# Patient Record
Sex: MALE | Race: WHITE | HISPANIC OR LATINO | Employment: FULL TIME | URBAN - METROPOLITAN AREA
[De-identification: names, ages, dates, MRNs, and addresses within clinical notes are randomized per-mention and may not be internally consistent; named-entity substitution may affect disease eponyms.]

---

## 2017-04-27 ENCOUNTER — HOSPITAL ENCOUNTER (OUTPATIENT)
Dept: RADIOLOGY | Facility: MEDICAL CENTER | Age: 52
End: 2017-04-27

## 2017-04-27 ENCOUNTER — HOSPITAL ENCOUNTER (INPATIENT)
Facility: MEDICAL CENTER | Age: 52
LOS: 1 days | DRG: 638 | End: 2017-04-28
Attending: HOSPITALIST | Admitting: HOSPITALIST
Payer: COMMERCIAL

## 2017-04-27 ENCOUNTER — RESOLUTE PROFESSIONAL BILLING HOSPITAL PROF FEE (OUTPATIENT)
Dept: HOSPITALIST | Facility: MEDICAL CENTER | Age: 52
End: 2017-04-27
Payer: COMMERCIAL

## 2017-04-27 DIAGNOSIS — L03.116 CELLULITIS OF LEFT LOWER EXTREMITY: ICD-10-CM

## 2017-04-27 PROBLEM — L03.90 CELLULITIS: Status: ACTIVE | Noted: 2017-04-27

## 2017-04-27 PROBLEM — E78.5 HYPERLIPIDEMIA: Status: ACTIVE | Noted: 2017-04-27

## 2017-04-27 PROBLEM — I10 HYPERTENSION: Status: ACTIVE | Noted: 2017-04-27

## 2017-04-27 LAB
ANION GAP SERPL CALC-SCNC: 5 MMOL/L (ref 0–11.9)
BASOPHILS # BLD AUTO: 0.7 % (ref 0–1.8)
BASOPHILS # BLD: 0.06 K/UL (ref 0–0.12)
BUN SERPL-MCNC: 16 MG/DL (ref 8–22)
CALCIUM SERPL-MCNC: 8.7 MG/DL (ref 8.5–10.5)
CHLORIDE SERPL-SCNC: 104 MMOL/L (ref 96–112)
CO2 SERPL-SCNC: 26 MMOL/L (ref 20–33)
CREAT SERPL-MCNC: 0.75 MG/DL (ref 0.5–1.4)
EOSINOPHIL # BLD AUTO: 0.07 K/UL (ref 0–0.51)
EOSINOPHIL NFR BLD: 0.9 % (ref 0–6.9)
ERYTHROCYTE [DISTWIDTH] IN BLOOD BY AUTOMATED COUNT: 38.3 FL (ref 35.9–50)
EST. AVERAGE GLUCOSE BLD GHB EST-MCNC: 263 MG/DL
GFR SERPL CREATININE-BSD FRML MDRD: >60 ML/MIN/1.73 M 2
GLUCOSE BLD-MCNC: 259 MG/DL (ref 65–99)
GLUCOSE BLD-MCNC: 271 MG/DL (ref 65–99)
GLUCOSE BLD-MCNC: 296 MG/DL (ref 65–99)
GLUCOSE BLD-MCNC: 309 MG/DL (ref 65–99)
GLUCOSE SERPL-MCNC: 295 MG/DL (ref 65–99)
GRAM STN SPEC: NORMAL
HBA1C MFR BLD: 10.8 % (ref 0–5.6)
HCT VFR BLD AUTO: 37.5 % (ref 42–52)
HGB BLD-MCNC: 13 G/DL (ref 14–18)
IMM GRANULOCYTES # BLD AUTO: 0.03 K/UL (ref 0–0.11)
IMM GRANULOCYTES NFR BLD AUTO: 0.4 % (ref 0–0.9)
LYMPHOCYTES # BLD AUTO: 1.2 K/UL (ref 1–4.8)
LYMPHOCYTES NFR BLD: 14.7 % (ref 22–41)
MCH RBC QN AUTO: 30.4 PG (ref 27–33)
MCHC RBC AUTO-ENTMCNC: 34.7 G/DL (ref 33.7–35.3)
MCV RBC AUTO: 87.8 FL (ref 81.4–97.8)
MONOCYTES # BLD AUTO: 0.91 K/UL (ref 0–0.85)
MONOCYTES NFR BLD AUTO: 11.2 % (ref 0–13.4)
NEUTROPHILS # BLD AUTO: 5.88 K/UL (ref 1.82–7.42)
NEUTROPHILS NFR BLD: 72.1 % (ref 44–72)
NRBC # BLD AUTO: 0 K/UL
NRBC BLD AUTO-RTO: 0 /100 WBC
PLATELET # BLD AUTO: 163 K/UL (ref 164–446)
PMV BLD AUTO: 11.9 FL (ref 9–12.9)
POTASSIUM SERPL-SCNC: 4.6 MMOL/L (ref 3.6–5.5)
RBC # BLD AUTO: 4.27 M/UL (ref 4.7–6.1)
SIGNIFICANT IND 70042: NORMAL
SITE SITE: NORMAL
SODIUM SERPL-SCNC: 135 MMOL/L (ref 135–145)
SOURCE SOURCE: NORMAL
WBC # BLD AUTO: 8.2 K/UL (ref 4.8–10.8)

## 2017-04-27 PROCEDURE — 87070 CULTURE OTHR SPECIMN AEROBIC: CPT

## 2017-04-27 PROCEDURE — A9270 NON-COVERED ITEM OR SERVICE: HCPCS | Performed by: HOSPITALIST

## 2017-04-27 PROCEDURE — 700102 HCHG RX REV CODE 250 W/ 637 OVERRIDE(OP): Performed by: HOSPITALIST

## 2017-04-27 PROCEDURE — 700101 HCHG RX REV CODE 250: Performed by: HOSPITALIST

## 2017-04-27 PROCEDURE — 83036 HEMOGLOBIN GLYCOSYLATED A1C: CPT

## 2017-04-27 PROCEDURE — 97161 PT EVAL LOW COMPLEX 20 MIN: CPT

## 2017-04-27 PROCEDURE — 770006 HCHG ROOM/CARE - MED/SURG/GYN SEMI*

## 2017-04-27 PROCEDURE — 80048 BASIC METABOLIC PNL TOTAL CA: CPT

## 2017-04-27 PROCEDURE — 85025 COMPLETE CBC W/AUTO DIFF WBC: CPT

## 2017-04-27 PROCEDURE — 87205 SMEAR GRAM STAIN: CPT

## 2017-04-27 PROCEDURE — 36415 COLL VENOUS BLD VENIPUNCTURE: CPT

## 2017-04-27 PROCEDURE — 700105 HCHG RX REV CODE 258: Performed by: HOSPITALIST

## 2017-04-27 PROCEDURE — 82962 GLUCOSE BLOOD TEST: CPT

## 2017-04-27 PROCEDURE — 700111 HCHG RX REV CODE 636 W/ 250 OVERRIDE (IP): Performed by: HOSPITALIST

## 2017-04-27 PROCEDURE — 99223 1ST HOSP IP/OBS HIGH 75: CPT | Performed by: HOSPITALIST

## 2017-04-27 RX ORDER — AMOXICILLIN 250 MG
2 CAPSULE ORAL 2 TIMES DAILY
Status: DISCONTINUED | OUTPATIENT
Start: 2017-04-27 | End: 2017-04-28 | Stop reason: HOSPADM

## 2017-04-27 RX ORDER — ATORVASTATIN CALCIUM 10 MG/1
10 TABLET, FILM COATED ORAL NIGHTLY
Status: DISCONTINUED | OUTPATIENT
Start: 2017-04-27 | End: 2017-04-28 | Stop reason: HOSPADM

## 2017-04-27 RX ORDER — MORPHINE SULFATE 4 MG/ML
2 INJECTION, SOLUTION INTRAMUSCULAR; INTRAVENOUS
Status: DISCONTINUED | OUTPATIENT
Start: 2017-04-27 | End: 2017-04-28 | Stop reason: HOSPADM

## 2017-04-27 RX ORDER — INSULIN GLARGINE 100 [IU]/ML
55 INJECTION, SOLUTION SUBCUTANEOUS NIGHTLY
COMMUNITY

## 2017-04-27 RX ORDER — LISINOPRIL 10 MG/1
10 TABLET ORAL DAILY
Status: DISCONTINUED | OUTPATIENT
Start: 2017-04-27 | End: 2017-04-28 | Stop reason: HOSPADM

## 2017-04-27 RX ORDER — SODIUM CHLORIDE 9 MG/ML
2000 INJECTION, SOLUTION INTRAVENOUS ONCE
Status: COMPLETED | OUTPATIENT
Start: 2017-04-27 | End: 2017-04-27

## 2017-04-27 RX ORDER — LISINOPRIL 10 MG/1
10 TABLET ORAL DAILY
COMMUNITY

## 2017-04-27 RX ORDER — OXYCODONE HYDROCHLORIDE 5 MG/1
2.5 TABLET ORAL
Status: DISCONTINUED | OUTPATIENT
Start: 2017-04-27 | End: 2017-04-28 | Stop reason: HOSPADM

## 2017-04-27 RX ORDER — OXYCODONE HYDROCHLORIDE 5 MG/1
5 TABLET ORAL
Status: DISCONTINUED | OUTPATIENT
Start: 2017-04-27 | End: 2017-04-28 | Stop reason: HOSPADM

## 2017-04-27 RX ORDER — ATORVASTATIN CALCIUM 10 MG/1
10 TABLET, FILM COATED ORAL NIGHTLY
COMMUNITY

## 2017-04-27 RX ORDER — BISACODYL 10 MG
10 SUPPOSITORY, RECTAL RECTAL
Status: DISCONTINUED | OUTPATIENT
Start: 2017-04-27 | End: 2017-04-28 | Stop reason: HOSPADM

## 2017-04-27 RX ORDER — ACETAMINOPHEN 325 MG/1
650 TABLET ORAL EVERY 6 HOURS PRN
Status: DISCONTINUED | OUTPATIENT
Start: 2017-04-27 | End: 2017-04-28 | Stop reason: HOSPADM

## 2017-04-27 RX ORDER — POLYETHYLENE GLYCOL 3350 17 G/17G
1 POWDER, FOR SOLUTION ORAL
Status: DISCONTINUED | OUTPATIENT
Start: 2017-04-27 | End: 2017-04-28 | Stop reason: HOSPADM

## 2017-04-27 RX ORDER — DEXTROSE MONOHYDRATE 25 G/50ML
25 INJECTION, SOLUTION INTRAVENOUS
Status: DISCONTINUED | OUTPATIENT
Start: 2017-04-27 | End: 2017-04-28 | Stop reason: HOSPADM

## 2017-04-27 RX ORDER — INSULIN GLARGINE 100 [IU]/ML
32 INJECTION, SOLUTION SUBCUTANEOUS NIGHTLY
Status: DISCONTINUED | OUTPATIENT
Start: 2017-04-27 | End: 2017-04-28 | Stop reason: HOSPADM

## 2017-04-27 RX ADMIN — INSULIN LISPRO 5 UNITS: 100 INJECTION, SOLUTION INTRAVENOUS; SUBCUTANEOUS at 17:49

## 2017-04-27 RX ADMIN — ACETAMINOPHEN 650 MG: 325 TABLET, FILM COATED ORAL at 11:58

## 2017-04-27 RX ADMIN — DOXYCYCLINE 100 MG: 100 INJECTION, POWDER, LYOPHILIZED, FOR SOLUTION INTRAVENOUS at 21:05

## 2017-04-27 RX ADMIN — ATORVASTATIN CALCIUM 10 MG: 10 TABLET, FILM COATED ORAL at 20:56

## 2017-04-27 RX ADMIN — INSULIN LISPRO 6 UNITS: 100 INJECTION, SOLUTION INTRAVENOUS; SUBCUTANEOUS at 06:33

## 2017-04-27 RX ADMIN — INSULIN LISPRO 5 UNITS: 100 INJECTION, SOLUTION INTRAVENOUS; SUBCUTANEOUS at 21:00

## 2017-04-27 RX ADMIN — SODIUM CHLORIDE 2000 ML: 9 INJECTION, SOLUTION INTRAVENOUS at 03:18

## 2017-04-27 RX ADMIN — LISINOPRIL 10 MG: 10 TABLET ORAL at 08:05

## 2017-04-27 RX ADMIN — INSULIN LISPRO 5 UNITS: 100 INJECTION, SOLUTION INTRAVENOUS; SUBCUTANEOUS at 12:11

## 2017-04-27 RX ADMIN — INSULIN GLARGINE 32 UNITS: 100 INJECTION, SOLUTION SUBCUTANEOUS at 21:02

## 2017-04-27 RX ADMIN — MORPHINE SULFATE 2 MG: 4 INJECTION INTRAVENOUS at 22:46

## 2017-04-27 RX ADMIN — ACETAMINOPHEN 650 MG: 325 TABLET, FILM COATED ORAL at 21:22

## 2017-04-27 RX ADMIN — STANDARDIZED SENNA CONCENTRATE AND DOCUSATE SODIUM 2 TABLET: 8.6; 5 TABLET, FILM COATED ORAL at 20:57

## 2017-04-27 RX ADMIN — AMPICILLIN SODIUM AND SULBACTAM SODIUM 3 G: 2; 1 INJECTION, POWDER, FOR SOLUTION INTRAMUSCULAR; INTRAVENOUS at 17:11

## 2017-04-27 RX ADMIN — MORPHINE SULFATE 2 MG: 4 INJECTION INTRAVENOUS at 15:10

## 2017-04-27 RX ADMIN — AMPICILLIN SODIUM AND SULBACTAM SODIUM 3 G: 2; 1 INJECTION, POWDER, FOR SOLUTION INTRAMUSCULAR; INTRAVENOUS at 06:09

## 2017-04-27 RX ADMIN — DOXYCYCLINE 100 MG: 100 INJECTION, POWDER, LYOPHILIZED, FOR SOLUTION INTRAVENOUS at 08:06

## 2017-04-27 RX ADMIN — AMPICILLIN SODIUM AND SULBACTAM SODIUM 3 G: 2; 1 INJECTION, POWDER, FOR SOLUTION INTRAMUSCULAR; INTRAVENOUS at 11:13

## 2017-04-27 ASSESSMENT — PAIN SCALES - GENERAL
PAINLEVEL_OUTOF10: 0
PAINLEVEL_OUTOF10: 10
PAINLEVEL_OUTOF10: 8
PAINLEVEL_OUTOF10: 5
PAINLEVEL_OUTOF10: 3

## 2017-04-27 ASSESSMENT — LIFESTYLE VARIABLES
ALCOHOL_USE: NO
EVER_SMOKED: NEVER

## 2017-04-27 NOTE — IP AVS SNAPSHOT
" Home Care Instructions                                                                                                                  Name:Epi Cortés  Medical Record Number:[id number]  CSN: [id number]    YOB: 1965   Age: 51 y.o.  Sex: male  HT:1.651 m (5' 5\") WT: 89.7 kg (197 lb 12 oz)          Admit Date: 4/27/2017     Discharge Date:   Today's Date: 4/28/2017  Attending Doctor:  Rojelio Zhou M.D.                  Allergies:  Review of patient's allergies indicates no known allergies.            Discharge Instructions       Discharge Instructions    Discharged to home by car with relative. Discharged via walking, hospital escort: Refused.  Special equipment needed: Crutches    Be sure to schedule a follow-up appointment with your primary care doctor or any specialists as instructed.     Discharge Plan:   Diet Plan: Discussed  Activity Level: Discussed  Confirmed Follow up Appointment: Appointment Scheduled  Confirmed Symptoms Management: Discussed  Medication Reconciliation Updated: Yes  Influenza Vaccine Indication: Not indicated: Previously immunized this influenza season and > 8 years of age    I understand that a diet low in cholesterol, fat, and sodium is recommended for good health. Unless I have been given specific instructions below for another diet, I accept this instruction as my diet prescription.   Other diet: Diabetic    Special Instructions: None    · Is patient discharged on Warfarin / Coumadin?   No     · Is patient Post Blood Transfusion?  No    Depression / Suicide Risk    As you are discharged from this [facility] facility, it is important to learn how to keep safe from harming yourself.    Recognize the warning signs:  · Abrupt changes in personality, positive or negative- including increase in energy   · Giving away possessions  · Change in eating patterns- significant weight changes-  positive or negative  · Change in sleeping patterns- unable to sleep or sleeping all " the time   · Unwillingness or inability to communicate  · Depression  · Unusual sadness, discouragement and loneliness  · Talk of wanting to die  · Neglect of personal appearance   · Rebelliousness- reckless behavior  · Withdrawal from people/activities they love  · Confusion- inability to concentrate     If you or a loved one observes any of these behaviors or has concerns about self-harm, here's what you can do:  · Talk about it- your feelings and reasons for harming yourself  · Remove any means that you might use to hurt yourself (examples: pills, rope, extension cords, firearm)  · Get professional help from the community (Mental Health, Substance Abuse, psychological counseling)  · Do not be alone:Call your Safe Contact- someone whom you trust who will be there for you.  · Call your local CRISIS HOTLINE 718-9834 or 920-788-5742  · Call your local Children's Mobile Crisis Response Team [place] (625) 531-6752 or www.[employer]  · Call the toll free National Suicide Prevention Hotlines   · National Suicide Prevention Lifeline 143-991-FWLM (3519)  · [employer] Hope Line Network 800-SUICIDE (861-8506)  Type 2 Diabetes Mellitus, Adult  Type 2 diabetes mellitus is a long-term (chronic) disease. In type 2 diabetes:  The pancreas does not make enough of a hormone called insulin.  The cells in the body do not respond as well to the insulin that is made.  Both of the above can happen.  Normally, insulin moves sugars from food into tissue cells. This gives you energy. If you have type 2 diabetes, sugars cannot be moved into tissue cells. This causes high blood sugar (hyperglycemia).   Your doctors will set personal treatment goals for you based on your age, your medicines, how long you have had diabetes, and any other medical conditions you have. Generally, the goal of treatment is to maintain the following blood glucose levels:  Before meals (preprandial):  mg/dL.  After meals (postprandial): below 180  mg/dL.  A1c: less than 6.5-7%.  HOME CARE  Have your hemoglobin A1c level checked twice a year. The level shows if your diabetes is under control or out of control.  Test your blood sugar level every day as told by your doctor.  Check your ketone levels by testing your pee (urine) when you are sick and as told.  Take your diabetes or insulin medicine as told by your doctor.  Never run out of insulin.  Adjust how much insulin you give yourself based on how many carbs (carbohydrates) you eat. Carbs are in many foods, such as fruits, vegetables, whole grains, and dairy products.  Have a healthy snack between every healthy meal. Have 3 meals and 3 snacks a day.  Lose weight if you are overweight.  Carry a medical alert card or wear your medical alert jewelry.  Carry a 15-gram carb snack with you at all times. Examples include:  Glucose pills, 3 or 4.  Glucose gel, 15-gram tube.  Raisins, 2 tablespoons (24 grams).  Jelly beans, 6.  Animal crackers, 8.  Regular (not diet) pop, 4 ounces (120 milliliters).  Gummy treats, 9.  Notice low blood sugar (hypoglycemia) symptoms, such as:  Shaking (tremors).  Trouble thinking clearly.  Sweating.  Faster heart rate.  Headache.  Dry mouth.  Hunger.  Crabbiness (irritability).  Being worried or tense (anxious).  Restless sleep.  A change in speech or coordination.  Confusion.  Treat low blood sugar right away. If you are alert and can swallow, follow the 15:15 rule:  Take 15-20 grams of a rapid-acting glucose or carb. This includes glucose gel, glucose pills, or 4 ounces (120 milliliters) of fruit juice, regular pop, or low-fat milk.  Check your blood sugar level 15 minutes after taking the glucose.  Take 15-20 grams more of glucose if the repeat blood sugar level is still 70 mg/dL (milligrams/deciliter) or below.  Eat a meal or snack within 1 hour of the blood sugar levels going back to normal.  Notice early symptoms of high blood sugar, such as:  Being really thirsty or drinking a  lot (polydipsia).  Peeing a lot (polyuria).  Do at least 150 minutes of physical activity a week or as told.  Split the 150 minutes of activity up during the week. Do not do 150 minutes of activity in one day.  Perform exercises, such as weight lifting, at least 2 times a week or as told.  Spend no more than 90 minutes at one time inactive.  Adjust your insulin or food intake as needed if you start a new exercise or sport.  Follow your sick-day plan when you are not able to eat or drink as usual.  Do not smoke, chew tobacco, or use electronic cigarettes.  Women who are not pregnant should drink no more than 1 drink a day. Men should drink no more than 2 drinks a day.  Only drink alcohol with food.  Ask your doctor if alcohol is safe for you.  Tell your doctor if you drink alcohol several times during the week.  See your doctor regularly.  Schedule an eye exam soon after you are told you have diabetes. Schedule exams once every year.  Check your skin and feet every day. Check for cuts, bruises, redness, nail problems, bleeding, blisters, or sores. A doctor should do a foot exam once a year.  Brush your teeth and gums twice a day. Floss once a day. Visit your dentist regularly.  Share your diabetes plan with your workplace or school.  Keep your shots that fight diseases (vaccines) up to date.  Get a flu (influenza) shot every year.  Get a pneumonia shot. If you are 65 years of age or older and you have never gotten a pneumonia shot, you might need to get two shots.  Ask your doctor which other shots you should get.  Learn how to deal with stress.  Get diabetes education and support as needed.  Ask your doctor for special help if:  You need help to maintain or improve how you do things on your own.  You need help to maintain or improve the quality of your life.  You have foot or hand problems.  You have trouble cleaning yourself, dressing, eating, or doing physical activity.  GET HELP IF:  You are unable to eat or  drink for more than 6 hours.  You feel sick to your stomach (nauseous) or throw up (vomit) for more than 6 hours.  Your blood sugar level is over 240 mg/dL.  There is a change in mental status.  You get another serious illness.  You have watery poop (diarrhea) for more than 6 hours.  You have been sick or have had a fever for 2 or more days and are not getting better.  You have pain when you are active.  GET HELP RIGHT AWAY IF:  You have trouble breathing.  Your ketone levels are higher than your doctor says they should be.  MAKE SURE YOU:  Understand these instructions.  Will watch your condition.  Will get help right away if you are not doing well or get worse.     This information is not intended to replace advice given to you by your health care provider. Make sure you discuss any questions you have with your health care provider.     Document Released: 09/26/2009 Document Revised: 05/03/2016 Document Reviewed: 07/19/2013  [employer] Interactive Patient Education ©2016 [employer]  Diabetes and Foot Care  Diabetes may cause you to have problems because of poor blood supply (circulation) to your feet and legs. This may cause the skin on your feet to become thinner, break easier, and heal more slowly. Your skin may become dry, and the skin may peel and crack. You may also have nerve damage in your legs and feet causing decreased feeling in them. You may not notice minor injuries to your feet that could lead to infections or more serious problems. Taking care of your feet is one of the most important things you can do for yourself.   HOME CARE INSTRUCTIONS  Wear shoes at all times, even in the house. Do not go barefoot. Bare feet are easily injured.  Check your feet daily for blisters, cuts, and redness. If you cannot see the bottom of your feet, use a mirror or ask someone for help.  Wash your feet with warm water (do not use hot water) and mild soap. Then pat your feet and the areas between your toes until they  are completely dry. Do not soak your feet as this can dry your skin.  Apply a moisturizing lotion or petroleum jelly (that does not contain alcohol and is unscented) to the skin on your feet and to dry, brittle toenails. Do not apply lotion between your toes.  Trim your toenails straight across. Do not dig under them or around the cuticle. File the edges of your nails with an emery board or nail file.  Do not cut corns or calluses or try to remove them with medicine.  Wear clean socks or stockings every day. Make sure they are not too tight. Do not wear knee-high stockings since they may decrease blood flow to your legs.  Wear shoes that fit properly and have enough cushioning. To break in new shoes, wear them for just a few hours a day. This prevents you from injuring your feet. Always look in your shoes before you put them on to be sure there are no objects inside.  Do not cross your legs. This may decrease the blood flow to your feet.  If you find a minor scrape, cut, or break in the skin on your feet, keep it and the skin around it clean and dry. These areas may be cleansed with mild soap and water. Do not cleanse the area with peroxide, alcohol, or iodine.  When you remove an adhesive bandage, be sure not to damage the skin around it.  If you have a wound, look at it several times a day to make sure it is healing.  Do not use heating pads or hot water bottles. They may burn your skin. If you have lost feeling in your feet or legs, you may not know it is happening until it is too late.  Make sure your health care provider performs a complete foot exam at least annually or more often if you have foot problems. Report any cuts, sores, or bruises to your health care provider immediately.  SEEK MEDICAL CARE IF:   You have an injury that is not healing.  You have cuts or breaks in the skin.  You have an ingrown nail.  You notice redness on your legs or feet.  You feel burning or tingling in your legs or feet.  You  have pain or cramps in your legs and feet.  Your legs or feet are numb.  Your feet always feel cold.  SEEK IMMEDIATE MEDICAL CARE IF:   There is increasing redness, swelling, or pain in or around a wound.  There is a red line that goes up your leg.  Pus is coming from a wound.  You develop a fever or as directed by your health care provider.  You notice a bad smell coming from an ulcer or wound.     This information is not intended to replace advice given to you by your health care provider. Make sure you discuss any questions you have with your health care provider.     Document Released: 12/15/2001 Document Revised: 08/20/2014 Document Reviewed: 05/27/2014  [employer] Interactive Patient Education ©2016 [employer]  Cellulitis  Cellulitis is an infection of the skin and the tissue under the skin. The infected area is usually red and tender. This happens most often in the arms and lower legs.  HOME CARE   · Take your antibiotic medicine as told. Finish the medicine even if you start to feel better.  · Keep the infected arm or leg raised (elevated).  · Put a warm cloth on the area up to 4 times per day.  · Only take medicines as told by your doctor.  · Keep all doctor visits as told.  GET HELP IF:  · You see red streaks on the skin coming from the infected area.  · Your red area gets bigger or turns a dark color.  · Your bone or joint under the infected area is painful after the skin heals.  · Your infection comes back in the same area or different area.  · You have a puffy (swollen) bump in the infected area.  · You have new symptoms.  · You have a fever.  GET HELP RIGHT AWAY IF:   · You feel very sleepy.  · You throw up (vomit) or have watery poop (diarrhea).  · You feel sick and have muscle aches and pains.  MAKE SURE YOU:   · Understand these instructions.  · Will watch your condition.  · Will get help right away if you are not doing well or get worse.     This information is not intended to replace advice given  to you by your health care provider. Make sure you discuss any questions you have with your health care provider.     Document Released: 06/05/2009 Document Revised: 01/08/2016 Document Reviewed: 03/04/2013  [employer] Interactive Patient Education ©2016 [employer]    Antibiotic Medication  Antibiotic medicine helps fight germs. Germs cause infections. This type of medicine will not work for colds, flu, or other viral infections. Tell your doctor if you:  · Are allergic to any medicines.  · Are pregnant or are trying to get pregnant.  · Are taking other medicines.  · Have other medical problems.  HOME CARE  · Take your medicine with a glass of water or food as told by your doctor.  · Take the medicine as told. Finish them even if you start to feel better.  · Do not give your medicine to other people.  · Do not use your medicine in the future for a different infection.  · Ask your doctor about which side effects to watch for.  · Try not to miss any doses. If you miss a dose, take it as soon as possible. If it is almost time for your next dose, and your dosing schedule is:  ¨ Two doses a day, take the missed dose and the next dose 5 to 6 hours later.  ¨ Three or more doses a day, take the missed dose and the next dose 2 to 4 hours later, or double your next dose.  ¨ Then go back to your normal schedule.  GET HELP RIGHT AWAY IF:   · You get worse or do not get better within a few days.  · The medicine makes you sick.  · You develop a rash or any other side effects.  · You have questions or concerns.  MAKE SURE YOU:  · Understand these instructions.  · Will watch your condition.  · Will get help right away if you are not doing well or get worse.     This information is not intended to replace advice given to you by your health care provider. Make sure you discuss any questions you have with your health care provider.     Document Released: 09/26/2009 Document Revised: 03/11/2013 Document Reviewed: 11/23/2010  [employer]  Interactive Patient Education ©2016 [employer]      Follow-up Information     1. Follow up with [facility]. Go on 5/12/2017.    Why:  Appointment time 9:10, please bring insurance info, ID, and current medications    Contact information    [address] [place] [address] [place] [address]  [phone]         Discharge Medication Instructions:    Below are the medications your physician expects you to take upon discharge:    Review all your home medications and newly ordered medications with your doctor and/or pharmacist. Follow medication instructions as directed by your doctor and/or pharmacist.    Please keep your medication list with you and share with your physician.               Medication List      START taking these medications        Instructions    Morning Afternoon Evening Bedtime    acetaminophen 325 MG Tabs   Last time this was given:  650 mg on 4/27/2017  9:22 PM   Commonly known as:  TYLENOL        Take 2 Tabs by mouth every 6 hours as needed (Mild Pain; (Pain scale 1-3); Temp greater than 100.5 F).   Dose:  650 mg                        amoxicillin-clavulanate 875-125 MG Tabs   Last time this was given:  1 Tab on 4/28/2017  1:16 PM   Commonly known as:  AUGMENTIN        Take 1 Tab by mouth every 12 hours for 8 days.   Dose:  1 Tab                        doxycycline monohydrate 100 MG tablet   Commonly known as:  ADOXA        Take 1 Tab by mouth every 12 hours for 8 days.   Dose:  100 mg                        oxycodone-acetaminophen 5-325 MG Tabs   Commonly known as:  PERCOCET        Take 1-2 Tabs by mouth every 8 hours as needed.   Dose:  1-2 Tab                          CONTINUE taking these medications        Instructions    Morning Afternoon Evening Bedtime    atorvastatin 10 MG Tabs   Last time this was given:  10 mg on 4/27/2017  8:56 PM   Commonly known as:  LIPITOR        Take 10 mg by mouth every evening.   Dose:  10 mg                        Docusate Sodium 100 MG Tabs        Take 1  Tab by mouth 2 times a day as needed (constipation).   Dose:  1 Tab                        insulin aspart 100 UNIT/ML Soln   Commonly known as:  NOVOLOG        Inject  as instructed 3 times a day before meals.                        insulin glargine 100 UNIT/ML Soln   Last time this was given:  32 Units on 4/27/2017  9:02 PM   Commonly known as:  LANTUS        Inject 32 Units as instructed every evening.   Dose:  32 Units                        lisinopril 10 MG Tabs   Last time this was given:  10 mg on 4/28/2017  8:38 AM   Commonly known as:  PRINIVIL        Take  by mouth every day.                        metformin 500 MG Tabs   Commonly known as:  GLUCOPHAGE        Take 1,000 mg by mouth every day.   Dose:  1000 mg                        Probiotic 250 MG Caps        Take 1 Cap by mouth 2 Times a Day.   Dose:  1 Cap                             Where to Get Your Medications      Information about where to get these medications is not yet available     ! Ask your nurse or doctor about these medications    - amoxicillin-clavulanate 875-125 MG Tabs  - doxycycline monohydrate 100 MG tablet  - oxycodone-acetaminophen 5-325 MG Tabs            Orders for after discharge     DME Crutches    Complete by:  As directed              Instructions           Diet / Nutrition:    Follow any diet instructions given to you by your doctor or the dietician, including how much salt (sodium) you are allowed each day.    If you are overweight, talk to your doctor about a weight reduction plan.    Activity:    Remain physically active following your doctor's instructions about exercise and activity.    Rest often.     Any time you become even a little tired or short of breath, SIT DOWN and rest.    Worsening Symptoms:    Report any of the following signs and symptoms to the doctor's office immediately:    *Pain of jaw, arm, or neck  *Chest pain not relieved by medication                               *Dizziness or loss of  consciousness  *Difficulty breathing even when at rest   *More tired than usual                                       *Bleeding drainage or swelling of surgical site  *Swelling of feet, ankles, legs or stomach                 *Fever (>100ºF)  *Pink or blood tinged sputum  *Weight gain (3lbs/day or 5lbs /week)           *Shock from internal defibrillator (if applicable)  *Palpitations or irregular heartbeats                *Cool and/or numb extremities    Stroke Awareness    Common Risk Factors for Stroke include:    Age  Atrial Fibrillation  Carotid Artery Stenosis  Diabetes Mellitus  Excessive alcohol consumption  High blood pressure  Overweight   Physical inactivity  Smoking    Warning signs and symptoms of a stroke include:    *Sudden numbness or weakness of the face, arm or leg (especially on one side of the body).  *Sudden confusion, trouble speaking or understanding.  *Sudden trouble seeing in one or both eyes.  *Sudden trouble walking, dizziness, loss of balance or coordination.Sudden severe headache with no known cause.    It is very important to get treatment quickly when a stroke occurs. If you experience any of the above warning signs, call 911 immediately.                   Disclaimer         Quit Smoking / Tobacco Use:    I understand the use of any tobacco products increases my chance of suffering from future heart disease or stroke and could cause other illnesses which may shorten my life. Quitting the use of tobacco products is the single most important thing I can do to improve my health. For further information on smoking / tobacco cessation call a Toll Free Quit Line at 1-247.202.3339 (*[employer] [place]) or 1-900.832.3168 (American Lung Association) or you can access the web based program at www.lungusa.org.    [place] Tobacco Users Help Line:  (945) 754-2652       Toll Free: 1-900.889.1392  Quit Tobacco Program [facility] (091)103-5077    Crisis Hotline:    National  Crisis Hotline:  5-088-ZXCXMZM or 1-955.316.7766    [place] Crisis Hotline:    1-538.674.3164 or 566-627-1517    Discharge Survey:   Thank you for choosing [facility]. We hope we did everything we could to make your hospital stay a pleasant one. You may be receiving a phone survey and we would appreciate your time and participation in answering the questions. Your input is very valuable to us in our efforts to improve our service to our patients and their families.        My signature on this form indicates that:    1. I have reviewed and understand the above information.  2. My questions regarding this information have been answered to my satisfaction.  3. I have formulated a plan with my discharge nurse to obtain my prescribed medications for home.                  Disclaimer         __________________________________                     __________       ________                       Patient Signature                                                 Date                    Time

## 2017-04-27 NOTE — PROGRESS NOTES
"2 RN skin check with GEMMA Martinez. Left lateral ankle redness/swelling, wound culture done and sent to lab. Bump noted on left shin, per pt \"my doctor told me it is fat\". Red spot also noted on left shin. Generalized scratches/bruising. Rest of skin intact, no open areas noted.  "

## 2017-04-27 NOTE — IP AVS SNAPSHOT
" <p align=\"LEFT\"><IMG SRC=\"//[email or web address]$/[email or web address]\" alt=\"Image\" WIDTH=\"50%\" HEIGHT=\"200\" BORDER=\"\"></p>                   Name:Epi Cortés  Medical Record Number:[id number]  CSN: [id number]    YOB: 1965   Age: 51 y.o.  Sex: male  HT:1.651 m (5' 5\") WT: 89.7 kg (197 lb 12 oz)          Admit Date: 4/27/2017     Discharge Date:   Today's Date: 4/28/2017  Attending Doctor:  Rojelio Zhou M.D.                  Allergies:  Review of patient's allergies indicates no known allergies.          Follow-up Information     1. Follow up with [facility]. Go on 5/12/2017.    Why:  Appointment time 9:10, please bring insurance info, ID, and current medications    Contact information    [address] [place] [address] [id number] [phone]         Medication List      Take these Medications        Instructions    acetaminophen 325 MG Tabs   Commonly known as:  TYLENOL    Take 2 Tabs by mouth every 6 hours as needed (Mild Pain; (Pain scale 1-3); Temp greater than 100.5 F).   Dose:  650 mg       amoxicillin-clavulanate 875-125 MG Tabs   Commonly known as:  AUGMENTIN    Take 1 Tab by mouth every 12 hours for 8 days.   Dose:  1 Tab       atorvastatin 10 MG Tabs   Commonly known as:  LIPITOR    Take 10 mg by mouth every evening.   Dose:  10 mg       Docusate Sodium 100 MG Tabs    Take 1 Tab by mouth 2 times a day as needed (constipation).   Dose:  1 Tab       doxycycline monohydrate 100 MG tablet   Commonly known as:  ADOXA    Take 1 Tab by mouth every 12 hours for 8 days.   Dose:  100 mg       insulin aspart 100 UNIT/ML Soln   Commonly known as:  NOVOLOG    Inject  as instructed 3 times a day before meals.       insulin glargine 100 UNIT/ML Soln   Commonly known as:  LANTUS    Inject 32 Units as instructed every evening.   Dose:  32 Units       lisinopril 10 MG Tabs   Commonly known as:  PRINIVIL    Take  by mouth every day.       metformin 500 MG Tabs   Commonly known as:  GLUCOPHAGE    Take 1,000 mg " by mouth every day.   Dose:  1000 mg       oxycodone-acetaminophen 5-325 MG Tabs   Commonly known as:  PERCOCET    Take 1-2 Tabs by mouth every 8 hours as needed.   Dose:  1-2 Tab       Probiotic 250 MG Caps    Take 1 Cap by mouth 2 Times a Day.   Dose:  1 Cap

## 2017-04-27 NOTE — PROGRESS NOTES
Direct admit from Dr. Lepe at [facility]. Dr. Ring accepting for Cellulitis and diabetic wound. ADT signed and held 04/27/2017 at 0016 and will need to be released upon patient arrival to unit. Patient arriving via ground transport.

## 2017-04-27 NOTE — ASSESSMENT & PLAN NOTE
L ankle ulcer with surrounding L foot cellulitis and swelling. Has mild L shin redness and benign shin lipoma.

## 2017-04-27 NOTE — PROGRESS NOTES
Pt a+ox4, ind in room . No complaints of pain, seen by wound RN for diabetic foot wound. Dressing CDI. Cont on iv abx. Pt complaining of body chills- temp 100.4, tylenol given.

## 2017-04-27 NOTE — CARE PLAN
Problem: Safety  Goal: Will remain free from falls  Intervention: Assess risk factors for falls  Pt not at risk for falls- ambulates steadily and independently

## 2017-04-27 NOTE — H&P
CHIEF COMPLAINT:  Left foot swelling and pain, transferred from [facility] for failed outpatient antibiotic therapy for left foot cellulitis   with diabetic foot wound.    PRIMARY MEDICAL PHYSICIAN:  Unknown.    HISTORY OF PRESENT ILLNESS:  This is a 51-year-old gentleman with a history of   hypertension, hyperlipidemia, and diabetes mellitus who reports poor sugar   control over the last six months and presented to an [facility] facility with   complaints of left foot and ankle pain and swelling with erythema.  The   patient states that approximately a week ago, he was absent-mindedly   scratching his left ankle when he broke his skin.  He developed erythema,   pain, and swelling to his left ankle approximately three days ago.  He was   seen on an outpatient basis by a physician who started him on oral antibiotic   therapy.  He was given strict instructions to present himself to the ER if   symptoms worsen.  Despite being on oral antibiotics, the patient had continued   worsening erythema and pain with swelling.  His symptoms were severe to the   point that he had difficulty with ambulation.  He also reports subjective   fevers.  Otherwise, he denies any nausea or vomiting.  No chills.  No chest   pain, shortness of breath, abdominal pain, diarrhea, or dysuria.  He was   initially seen at [facility] emergency room where he was   noted to have an out of service area insurance and therefore, was transferred   to [facility] for continued care.  The patient states that   his blood sugars have been elevated for the last six months.  However, he has   been actively working on getting them lower and in fact stopped drinking six   weeks ago, which has significantly improved his blood sugar profile.    REVIEW OF SYSTEMS:  A full review of systems was completed and all pertinent   positives and negatives are included in the HPI above.    PAST MEDICAL HISTORY:  1.   Hypertension.  2.  Hyperlipidemia.  3.  Diabetes mellitus.    PAST SURGICAL HISTORY:  The patient denies.    SOCIAL HISTORY:  No tobacco or illicit drugs.  The patient is a recovering   alcoholic and has been sober for the last six weeks.  He works as a ,   cooker, and [employer] .    FAMILY HISTORY:  Father  from [place] in his 60s.    ALLERGIES:  No known drug allergies.    HOME MEDICATIONS:  1.  Lantus 32 units subcutaneously daily.  2.  NovoLog sliding scale for preprandial coverage.  3.  Metformin 1000 mg p.o. every day.  4.  Lipitor 10 mg p.o. every day.  5.  Prinivil 10 mg p.o. every day.    PHYSICAL EXAMINATION:  VITAL SIGNS:  Temperature 98.0, heart rate 54, respirations 17, and blood   pressure 147/77.  Patient is saturating at 99% on 2 L of oxygen.  GENERAL:  This is a well-appearing, middle-aged  male who is in no   acute distress.  HEENT:  Normocephalic and atraumatic.  EOMI, moist mucous membranes.  NECK:  Supple, there is no JVD.  There is no supraclavicular adenopathy.  CARDIOVASCULAR:  Positive S1, S2, regular rate and rhythm.  No murmurs, rubs,   or gallops appreciated.  PULMONARY:  Clear to auscultation bilaterally.  No wheezes, rubs, or rhonchi   heard.  GASTROINTESTINAL:  Soft, nontender, and nondistended.  Positive bowel sounds.    No hepatosplenomegaly.  EXTREMITIES:  Warm and well-perfused.  No clubbing, cyanosis, or edema.    Capillary refill less than 3 seconds.  Distal pulses are intact.  Patient has   an open wound on his left lateral ankle.  There is no purulence.  There is no   induration.  There is a surrounding area of erythema approximately 4 cm in   diameter.  There is minimal swelling.  It is warm and mildly painful to   palpation.  There is no active seepage.  NEUROLOGICAL:  A and O x3.  Cranial nerves II-XII grossly intact.    ASSESSMENT AND PLAN:  This is a 51-year-old gentleman who was sent from   [facility] emergency room secondary to  failed outpatient   antibiotic therapy for left leg cellulitis and diabetic wound.  1.  Left foot cellulitis and diabetic foot wound:  Currently, it does appear   dry, but he does have an open broken area of skin.  We will see if we can   obtain a sample for culture.  In the meantime, I will start him on IV Unasyn   and doxycycline as the skin is broken and he does have a risk of   Methicillin-resistant Staphylococcus aureus infection.  I have requested a   wound care consultation.  He will have symptomatic management for pain.  2.  Hypertension.  Continue Prinivil.  3.  Hyperlipidemia.  Continue Lipitor.  4.  Diabetes mellitus.  Continue home Lantus, holding metformin for now until   we have chemistries back.  If he has no evidence of acidosis, then we will   restart his home metformin.  He will be monitored on Accu-Cheks and then   covered with insulin sliding scale.    DISPOSITION:  Medical floor.    PROPHYLAXIS:  Sequential compression devices for DVT prophylaxis, no PPI   indicated, and stool softeners ordered.    CODE:  Full.       ____________________________________     BERNY LEMUS MD    DTC / NTS    DD:  04/27/2017 03:23:03  DT:  04/27/2017 04:05:37    D#:  [id number]  Job#:  [id number]

## 2017-04-27 NOTE — WOUND TEAM
"[facility]  Inpatient Services  Initial Wound & Skin Care Evaluation    Admission Date:   4/27/17        HPI, PMH, SH: Reviewed  Unit where seen by Wound Team:  [id number]    WOUND CONSULT RELATED TO: left lateral ankle wound    SUBJECTIVE:  \"It feels much better since I got here.\"     Self Report / Pain Level: 3/10 with palpation of the left ankle     OBJECTIVE:  Pt supine in bed, left lateral ankle MIREYA    WOUND TYPE, LOCATION, CHARACTERISTICS (Pressure ulcers: location, stage, POA or date identified)    Wound Type/Location: left lateral ankle , neuropathic ulceration     Periwound: edematous, ecchymotic, warm      Drainage: none      Tissue Type and %:  100% red    Wound Edges:  open    Odor: none      Exposed structure(s):  none   S&S of Infection:  Edema, warmth    Measurements: taken 4/27/17    Length:    0.2cm   Width:     0.2cm   Depth:    0.2cm   Tracts/undermining:   none      INTERVENTIONS BY WOUND TEAM:  Pulses palpated at LLE including PD and post tib - both easily palpated and strong.  Cleansed area with NS and patted dry.  Measured site.  Applied silver powder to wound followed by a silicone ad foam.  Dressing maintenance orders written for NRSG to follow.       Dressing types:  Silver powder, ad foam     Interdisciplinary Collaboration: RN, Pt    EVALUATION:  Pt presents with a small non-draining wound to his left lateral ankle with surrounding edema and erythema.  Pt is currently on IV antibiotics and reports decreased pain and swelling since being admitted.  Silver powder used for topical antimicrobial effect and covered with an ad foam to monitor for any drainage.          Factors affecting wound healing:  Infection, DM    Goals:  Wound to remain dry and decrease in size by 1%/week    NURSING PLAN OF CARE: (X)  Dressing changes: See Dressing Maintenance orders: X  Skin care: See Skin Care orders:   Rectal tube care: See Rectal Tube Care orders:   Other orders:    RSKIN: CURRENT (X) " ORDERED (O)  Q shift Gordon:  X  Q shift pressure point assessments:  X  Atmosair  X      JUANITA      Bariatric JUANITA      Bariatric foam        Heel float boots       Heels floated on pillows      Barrier wipes      Barrier Cream      Barrier paste      Sacral silicone dressing      Silicone O2 tubing      Anchorfast      Trach with Optifoam split foam       Waffle cushion      Rectal tube or BMS      Antifungal tx    Turn q 2 hours X - independent with bed mobility   Up to chair    Ambulate X  PT/OT     Dietician      PO  X   TF   TPN     PVN    NPO   # days   Other       WOUND TEAM PLAN OF CARE (X):   NPWT change 3 x week:        Dressing changes by wound team:       Follow up as needed:     X  Other (explain):    Anticipated discharge plans (X):  SNF:           Home Care:           Outpatient Wound Center:            Self Care:   X         Other:

## 2017-04-27 NOTE — IP AVS SNAPSHOT
Hurricane Party Access Code: [email or web address]  Expires: 5/28/2017  5:11 PM    Your email address is not on file at [facility].  Email Addresses are required for you to sign up for Hurricane Party, please contact [phone] to verify your personal information and to provide your email address prior to attempting to register for Hurricane Party.    Epi Cortés  [address] [place] [address]    Hurricane Party  A secure, online tool to manage your health information     [facility]’s Hurricane Party® is a secure, online tool that connects you to your personalized health information from the privacy of your home -- day or night - making it very easy for you to manage your healthcare. Once the activation process is completed, you can even access your medical information using the Hurricane Party amparo, which is available for free in the Apple Amparo store or Google Play store.     To learn more about Hurricane Party, visit [email or web address]    There are two levels of access available (as shown below):   My Chart Features  [facility] Primary Care Doctor [facility]  Specialists [facility]  Urgent  Care Non-[facility] Primary Care Doctor   Email your healthcare team securely and privately 24/7 X X X    Manage appointments: schedule your next appointment; view details of past/upcoming appointments X      Request prescription refills. X      View recent personal medical records, including lab and immunizations X X X X   View health record, including health history, allergies, medications X X X X   Read reports about your outpatient visits, procedures, consult and ER notes X X X X   See your discharge summary, which is a recap of your hospital and/or ER visit that includes your diagnosis, lab results, and care plan X X  X     How to register for Hurricane Party:  Once your e-mail address has been verified, follow the following steps to sign up for Hurricane Party.     1. Go to  [email or web address]  2. Click on the Sign Up Now box, which takes you to the New Member Sign Up page.  You will need to provide the following information:  a. Enter your [employer] Access Code exactly as it appears at the top of this page. (You will not need to use this code after you’ve completed the sign-up process. If you do not sign up before the expiration date, you must request a new code.)   b. Enter your date of birth.   c. Enter your home email address.   d. Click Submit, and follow the next screen’s instructions.  3. Create a [employer] ID. This will be your [employer] login ID and cannot be changed, so think of one that is secure and easy to remember.  4. Create a [employer] password. You can change your password at any time.  5. Enter your Password Reset Question and Answer. This can be used at a later time if you forget your password.   6. Enter your e-mail address. This allows you to receive e-mail notifications when new information is available in [employer].  7. Click Sign Up. You can now view your health information.    For assistance activating your [employer] account, call [phone]

## 2017-04-27 NOTE — PROGRESS NOTES
Received report from GEMMA Alfonso at [facility]. Pt on unit @ 0135 via [facility] to Room 615 bed 1, accompanied by wife and daughter. Assessment completed. Pt AOx4. No s/s of dyspnea or respiratory distress, continues to be on 2L NC, baseline at home RA. No c/o pain/discomfort. Call light within reach, personal belongings available, bed in lowest position, treaded socks on. Hourly rounding in place. Admit profile completed.    Pt up self, ambulates with steady gait. Uses call light appropriately for assistance.

## 2017-04-27 NOTE — IP AVS SNAPSHOT
4/28/2017    Epi Cortés  [address]  [facility] [address]    Dear Epi:    [facility] wants to ensure your discharge home is safe and you or your loved ones have had all of your questions answered regarding your care after you leave the hospital.    Below is a list of resources and contact information should you have any questions regarding your hospital stay, follow-up instructions, or active medical symptoms.    Questions or Concerns Regarding… Contact   Medical Questions Related to Your Discharge  (7 days a week, 8am-5pm) Contact a Nurse Care Coordinator   [phone]   Medical Questions Not Related to Your Discharge  (24 hours a day / 7 days a week)  Contact the Nurse Health Line   [phone]    Medications or Discharge Instructions Refer to your discharge packet   or contact your [facility] Primary Care Provider   [phone]   Follow-up Appointment(s) Schedule your appointment via Usabilla   or contact Scheduling [phone]   Billing Review your statement via Usabilla  or contact Billing [phone]   Medical Records Review your records via Usabilla   or contact Medical Records [phone]     You may receive a telephone call within two days of discharge. This call is to make certain you understand your discharge instructions and have the opportunity to have any questions answered. You can also easily access your medical information, test results and upcoming appointments via the Usabilla free online health management tool. You can learn more and sign up at N(i)Â²/Usabilla. For assistance setting up your Usabilla account, please call [phone].    Once again, we want to ensure your discharge home is safe and that you have a clear understanding of any next steps in your care. If you have any questions or concerns, please do not hesitate to contact us, we are here for you. Thank you for choosing [facility] for your healthcare needs.    Sincerely,    Your [facility] Healthcare Team

## 2017-04-27 NOTE — CARE PLAN
Problem: Safety  Goal: Will remain free from falls  Safety precautions in place. Bed in low position, treaded socks on, personal possessions in reach, call light in reach and pt using it to call for assistance. Pt up self, ambulates with steady gait.    Problem: Infection  Goal: Will remain free from infection  Pt to start IV abx unasyn in AM.

## 2017-04-28 VITALS
RESPIRATION RATE: 20 BRPM | TEMPERATURE: 99.5 F | DIASTOLIC BLOOD PRESSURE: 89 MMHG | HEART RATE: 74 BPM | OXYGEN SATURATION: 92 % | BODY MASS INDEX: 32.95 KG/M2 | HEIGHT: 65 IN | SYSTOLIC BLOOD PRESSURE: 158 MMHG | WEIGHT: 197.75 LBS

## 2017-04-28 LAB
ANION GAP SERPL CALC-SCNC: 6 MMOL/L (ref 0–11.9)
BUN SERPL-MCNC: 11 MG/DL (ref 8–22)
CALCIUM SERPL-MCNC: 8.9 MG/DL (ref 8.5–10.5)
CHLORIDE SERPL-SCNC: 105 MMOL/L (ref 96–112)
CO2 SERPL-SCNC: 26 MMOL/L (ref 20–33)
CREAT SERPL-MCNC: 0.82 MG/DL (ref 0.5–1.4)
ERYTHROCYTE [DISTWIDTH] IN BLOOD BY AUTOMATED COUNT: 37 FL (ref 35.9–50)
GFR SERPL CREATININE-BSD FRML MDRD: >60 ML/MIN/1.73 M 2
GLUCOSE BLD-MCNC: 189 MG/DL (ref 65–99)
GLUCOSE BLD-MCNC: 201 MG/DL (ref 65–99)
GLUCOSE SERPL-MCNC: 167 MG/DL (ref 65–99)
HCT VFR BLD AUTO: 38 % (ref 42–52)
HGB BLD-MCNC: 13.4 G/DL (ref 14–18)
MCH RBC QN AUTO: 30.8 PG (ref 27–33)
MCHC RBC AUTO-ENTMCNC: 35.3 G/DL (ref 33.7–35.3)
MCV RBC AUTO: 87.4 FL (ref 81.4–97.8)
PLATELET # BLD AUTO: 187 K/UL (ref 164–446)
PMV BLD AUTO: 11 FL (ref 9–12.9)
POTASSIUM SERPL-SCNC: 3.6 MMOL/L (ref 3.6–5.5)
RBC # BLD AUTO: 4.35 M/UL (ref 4.7–6.1)
SODIUM SERPL-SCNC: 137 MMOL/L (ref 135–145)
WBC # BLD AUTO: 7.7 K/UL (ref 4.8–10.8)

## 2017-04-28 PROCEDURE — 700105 HCHG RX REV CODE 258: Performed by: HOSPITALIST

## 2017-04-28 PROCEDURE — 700111 HCHG RX REV CODE 636 W/ 250 OVERRIDE (IP): Performed by: HOSPITALIST

## 2017-04-28 PROCEDURE — 82962 GLUCOSE BLOOD TEST: CPT

## 2017-04-28 PROCEDURE — 700102 HCHG RX REV CODE 250 W/ 637 OVERRIDE(OP): Performed by: INTERNAL MEDICINE

## 2017-04-28 PROCEDURE — 36415 COLL VENOUS BLD VENIPUNCTURE: CPT

## 2017-04-28 PROCEDURE — 97161 PT EVAL LOW COMPLEX 20 MIN: CPT

## 2017-04-28 PROCEDURE — 700102 HCHG RX REV CODE 250 W/ 637 OVERRIDE(OP): Performed by: HOSPITALIST

## 2017-04-28 PROCEDURE — 700101 HCHG RX REV CODE 250: Performed by: HOSPITALIST

## 2017-04-28 PROCEDURE — A9270 NON-COVERED ITEM OR SERVICE: HCPCS | Performed by: INTERNAL MEDICINE

## 2017-04-28 PROCEDURE — 99239 HOSP IP/OBS DSCHRG MGMT >30: CPT | Performed by: INTERNAL MEDICINE

## 2017-04-28 PROCEDURE — G8978 MOBILITY CURRENT STATUS: HCPCS | Mod: CI

## 2017-04-28 PROCEDURE — G8979 MOBILITY GOAL STATUS: HCPCS | Mod: CI

## 2017-04-28 PROCEDURE — 80048 BASIC METABOLIC PNL TOTAL CA: CPT

## 2017-04-28 PROCEDURE — G8980 MOBILITY D/C STATUS: HCPCS | Mod: CI

## 2017-04-28 PROCEDURE — 85027 COMPLETE CBC AUTOMATED: CPT

## 2017-04-28 PROCEDURE — A9270 NON-COVERED ITEM OR SERVICE: HCPCS | Performed by: HOSPITALIST

## 2017-04-28 RX ORDER — OXYCODONE HYDROCHLORIDE AND ACETAMINOPHEN 5; 325 MG/1; MG/1
1-2 TABLET ORAL EVERY 8 HOURS PRN
Qty: 12 TAB | Refills: 0 | Status: SHIPPED | OUTPATIENT
Start: 2017-04-28 | End: 2019-01-08

## 2017-04-28 RX ORDER — AMOXICILLIN AND CLAVULANATE POTASSIUM 875; 125 MG/1; MG/1
1 TABLET, FILM COATED ORAL EVERY 12 HOURS
Status: DISCONTINUED | OUTPATIENT
Start: 2017-04-28 | End: 2017-04-28 | Stop reason: HOSPADM

## 2017-04-28 RX ORDER — DOXYCYCLINE 100 MG/1
100 TABLET ORAL EVERY 12 HOURS
Status: DISCONTINUED | OUTPATIENT
Start: 2017-04-28 | End: 2017-04-28 | Stop reason: HOSPADM

## 2017-04-28 RX ORDER — DOXYCYCLINE 100 MG/1
100 TABLET ORAL EVERY 12 HOURS
Qty: 16 TAB | Refills: 0 | Status: SHIPPED | OUTPATIENT
Start: 2017-04-28 | End: 2017-05-06

## 2017-04-28 RX ORDER — NICOTINE 14MG/24HR
1 PATCH, TRANSDERMAL 24 HOURS TRANSDERMAL 2 TIMES DAILY
Qty: 14 CAP | Refills: 0 | COMMUNITY
Start: 2017-04-28 | End: 2019-01-08

## 2017-04-28 RX ORDER — ASPIRIN 81 MG
1 TABLET, DELAYED RELEASE (ENTERIC COATED) ORAL 2 TIMES DAILY PRN
Qty: 14 TAB | Refills: 0 | COMMUNITY
Start: 2017-04-28 | End: 2019-01-08

## 2017-04-28 RX ORDER — ACETAMINOPHEN 325 MG/1
650 TABLET ORAL EVERY 6 HOURS PRN
Qty: 30 TAB | Refills: 0 | COMMUNITY
Start: 2017-04-28 | End: 2019-01-08

## 2017-04-28 RX ORDER — AMOXICILLIN AND CLAVULANATE POTASSIUM 875; 125 MG/1; MG/1
1 TABLET, FILM COATED ORAL EVERY 12 HOURS
Qty: 16 TAB | Refills: 0 | Status: SHIPPED | OUTPATIENT
Start: 2017-04-28 | End: 2017-05-06

## 2017-04-28 RX ADMIN — AMPICILLIN SODIUM AND SULBACTAM SODIUM 3 G: 2; 1 INJECTION, POWDER, FOR SOLUTION INTRAMUSCULAR; INTRAVENOUS at 05:28

## 2017-04-28 RX ADMIN — OXYCODONE HYDROCHLORIDE 5 MG: 5 TABLET ORAL at 13:16

## 2017-04-28 RX ADMIN — INSULIN LISPRO 3 UNITS: 100 INJECTION, SOLUTION INTRAVENOUS; SUBCUTANEOUS at 13:14

## 2017-04-28 RX ADMIN — AMOXICILLIN AND CLAVULANATE POTASSIUM 1 TABLET: 875; 125 TABLET, FILM COATED ORAL at 13:16

## 2017-04-28 RX ADMIN — AMPICILLIN SODIUM AND SULBACTAM SODIUM 3 G: 2; 1 INJECTION, POWDER, FOR SOLUTION INTRAMUSCULAR; INTRAVENOUS at 00:33

## 2017-04-28 RX ADMIN — MORPHINE SULFATE 2 MG: 4 INJECTION INTRAVENOUS at 08:35

## 2017-04-28 RX ADMIN — LISINOPRIL 10 MG: 10 TABLET ORAL at 08:38

## 2017-04-28 RX ADMIN — STANDARDIZED SENNA CONCENTRATE AND DOCUSATE SODIUM 2 TABLET: 8.6; 5 TABLET, FILM COATED ORAL at 08:38

## 2017-04-28 RX ADMIN — INSULIN LISPRO 2 UNITS: 100 INJECTION, SOLUTION INTRAVENOUS; SUBCUTANEOUS at 05:37

## 2017-04-28 RX ADMIN — DOXYCYCLINE 100 MG: 100 INJECTION, POWDER, LYOPHILIZED, FOR SOLUTION INTRAVENOUS at 08:44

## 2017-04-28 ASSESSMENT — GAIT ASSESSMENTS
DEVIATION: ANTALGIC
DISTANCE (FEET): 100
GAIT LEVEL OF ASSIST: SUPERVISED
ASSISTIVE DEVICE: CRUTCHES

## 2017-04-28 ASSESSMENT — PATIENT HEALTH QUESTIONNAIRE - PHQ9
1. LITTLE INTEREST OR PLEASURE IN DOING THINGS: NOT AT ALL
SUM OF ALL RESPONSES TO PHQ QUESTIONS 1-9: 0
2. FEELING DOWN, DEPRESSED, IRRITABLE, OR HOPELESS: NOT AT ALL
SUM OF ALL RESPONSES TO PHQ9 QUESTIONS 1 AND 2: 0

## 2017-04-28 ASSESSMENT — LIFESTYLE VARIABLES: EVER_SMOKED: NEVER

## 2017-04-28 ASSESSMENT — PAIN SCALES - GENERAL: PAINLEVEL_OUTOF10: 6

## 2017-04-28 NOTE — CARE PLAN
Problem: Infection  Goal: Will remain free from infection  Outcome: PROGRESSING AS EXPECTED  Educate signs and symptoms of infection, abx IV given per MAR    Problem: Pain Management  Goal: Pain level will decrease to patient’s comfort goal  Outcome: PROGRESSING AS EXPECTED  Prn pain med given, instruct pt elevate L leg up to decrease swelling and pain

## 2017-04-28 NOTE — THERAPY
"Physical Therapy Evaluation completed.   Bed Mobility:  Supine to Sit: Supervised  Transfers: Sit to Stand: Supervised  Gait: Level Of Assist: Supervised with Crutches       Plan of Care: Patient with no further skilled PT needs in the acute care setting at this time and Patient demonstrates safety with mobility in this environment at this time.   Discharge Recommendations: Equipment: Crutches. Post-acute therapy Currently anticipate no further skilled therapy needs once patient is discharged from the inpatient setting.    See \"Rehab Therapy-Acute\" Patient Summary Report for complete documentation.     "

## 2017-04-28 NOTE — DISCHARGE PLANNING
Care Transition Team Assessment    Information Source  Orientation : Oriented x 4  Information Given By: Patient  Who is responsible for making decisions for patient? : Patient    Readmission Evaluation  Is this a readmission?: No    Elopement Risk  Legal Hold: No    Interdisciplinary Discharge Planning  Does Admitting Nurse Feel This Could be a Complex Discharge?: No  Primary Care Physician: Rose @ [facility]  Lives with - Patient's Self Care Capacity: Spouse, Child Less than 18 Years of Age, Parents  Support Systems: Spouse / Significant Other, Parent, Family Member(s), Children  Housing / Facility: [address]  Do You Take your Prescribed Medications Regularly: Yes  Able to Return to Previous ADL's: Yes  Mobility Issues: No  Prior Services: None  Patient Expects to be Discharged to:: home  Assistance Needed: No  Durable Medical Equipment: Not Applicable    Discharge Preparedness  What is your plan after discharge?: Other (comment) (Home)  What are your discharge supports?: Child, Parent, Spouse  Prior Functional Level: Ambulatory, Drives Self, Independent with Activities of Daily Living, Independent with Medication Management  Difficulity with ADLs: None  Difficulity with IADLs: None    Functional Assesment  Prior Functional Level: Ambulatory, Drives Self, Independent with Activities of Daily Living, Independent with Medication Management    Finances  Financial Barriers to Discharge: No  Prescription Coverage: Yes    Vision / Hearing Impairment  Vision Impairment : Yes  Right Eye Vision: Impaired  Left Eye Vision: Impaired  Hearing Impairment : Yes  Hearing Impairment: Left Ear    Values / Beliefs / Concerns  Values / Beliefs Concerns : No    Advance Directive  Advance Directive?: None  Advance Directive offered?: AD Booklet refused    Domestic Abuse  Have you ever been the victim of abuse or violence?: No  Physical Abuse or Sexual Abuse: No  Verbal Abuse or Emotional Abuse: No  Possible Abuse Reported to::  Not Applicable    Psychological Assessment  History of Substance Abuse: Alcohol  Date Last Used - Alcohol:  (six weeks ago)  Substance Abuse Comments:  (Was drinking uulab30-05 beers a day)  History of Psychiatric Problems: No  Non-compliant with Treatment: No  Newly Diagnosed Illness: No    Discharge Risks or Barriers  Discharge risks or barriers?: Substance abuse  Patient risk factors: Substance abuse    Anticipated Discharge Information  Anticipated discharge disposition: Home  Discharge Address:  ([address] [place] [address] )  Discharge Contact Phone Number:  ([phone])

## 2017-04-28 NOTE — CARE PLAN
Problem: Knowledge Deficit  Goal: Knowledge of disease process/condition, treatment plan, diagnostic tests, and medications will improve  Intervention: Explain information regarding disease process/condition, treatment plan, diagnostic tests, and medications and document in education  Educated on importance of glucose control to prevent recurring infection, care of feet and skin with DM. Verbalized understanding.       Problem: Pain Management  Goal: Pain level will decrease to patient’s comfort goal  Intervention: Educate and implement non-pharmacologic comfort measures. Examples: relaxation, distration, play therapy, activity therapy, massage, etc.  Medicated for pain and educated on non-pharmacological pain relieving measures such as relaxation, repositioning, ice, elevation. Verbalized understanding.

## 2017-04-28 NOTE — DISCHARGE SUMMARY
HOSPITAL MEDICINE DISCHARGE SUMMARY    Name: Epi Cortés  MRN: [id number]  : 1965  Admit Date: 2017  Discharge Date: 2017  Attending Provider: Rojelio Zhou M.D.  Primary Care Physician: No primary care provider on file.    DISCHARGE DIAGNOSES, PLAN AND FOLLOW-UP AND HOSPITAL COURSE  Please review Dr. Deborah Ring M.D. notes for further details of history of present illness, past medical/social/family histories, allergies and medications.   This is a 51-year-old gentleman with a history of   hypertension, hyperlipidemia, and diabetes mellitus who reports poor sugar    control over the last six months and presented to an outlying facility with    complaints of left foot and ankle pain and swelling with erythema.  The    patient states that approximately a week ago, he was absent-mindedly    scratching his left ankle when he broke his skin.  He developed erythema,    pain, and swelling to his left ankle approximately three days ago.  He was    seen on an outpatient basis by a physician who started him on oral antibiotic    therapy.  He was given strict instructions to present himself to the ER if    symptoms worsen.  Despite being on oral antibiotics, the patient had continued   worsening erythema and pain with swelling.  His symptoms were severe to the    point that he had difficulty with ambulation.  He also reports subjective    fevers.  Otherwise, he denies any nausea or vomiting.  No chills.  No chest    pain, shortness of breath, abdominal pain, diarrhea, or dysuria.  He was    initially seen at [facility] emergency room where he was    noted to have an out of service area insurance and therefore, was transferred    to [facility] for continued care.  The patient states that   his blood sugars have been elevated for the last six months.  However, he has   been actively working on getting them lower and in fact stopped drinking six    weeks ago,  which has significantly improved his blood sugar profile.    * Cellulitis  Assessment & Plan  L ankle ulcer with surrounding L foot cellulitis and swelling. Has mild L shin redness and benign shin lipoma. The redness has resolved and foot is significantly less swollen. Dressings clean dry and intact. He improved with IV antibiotics. He will go on Augmentin and doxycycline for 8 more days for a total of 10 days course. Wound nurse will give him dressings and instructions. I will give him bowel regimen and probiotics, which can be obtained over the counter. PRN Percocets for pain control and he has been provided with crutches. He is instructed to come to the ER or urgent care if it gets worse. He walked with PT and nurses and wanted to be discharged today.    Diabetes mellitus type II, uncontrolled (CMS-[facility])  Uncontrolled diabetes. A1c 10.8. Assign and follow up to primary care physician (ALONSO) and continue to monitor his diabetes. Meanwhile he is on Lantus and food insulin/SSI at home.    Hypertension  Stable. Continue his lisinopril    Hyperlipidemia  Continue his statin     Please see discharge diagnoses, plan and follow up above for details of presenting problem and other medical issues    Epi Cortés improved and was deemed ready to be discharged from the hospital as there were no further inpatient needs. Epi Cortés felt comfortable going home with crutches. The discharge plan was discussed with Epi Cortés, and agreed to it. Epi Cortés was subsequently discharged in improved and stable condition.    DISCHARGE MEDICATIONS:    Epi Hernadez   Home Medication Instructions RACHANA:[id number]    Printed on:04/28/17 3416   Medication Information                      acetaminophen (TYLENOL) 325 MG Tab  Take 2 Tabs by mouth every 6 hours as needed (Mild Pain; (Pain scale 1-3); Temp greater than 100.5 F).             amoxicillin-clavulanate (AUGMENTIN) 875-125 MG Tab  Take 1 Tab by mouth  every 12 hours for 8 days.             atorvastatin (LIPITOR) 10 MG Tab  Take 10 mg by mouth every evening.             Docusate Sodium 100 MG Tab  Take 1 Tab by mouth 2 times a day as needed (constipation).             doxycycline monohydrate (ADOXA) 100 MG tablet  Take 1 Tab by mouth every 12 hours for 8 days.             insulin aspart (NOVOLOG) 100 UNIT/ML Solution  Inject  as instructed 3 times a day before meals.             insulin glargine (LANTUS) 100 UNIT/ML Solution  Inject 32 Units as instructed every evening.             lisinopril (PRINIVIL) 10 MG Tab  Take  by mouth every day.             metformin (GLUCOPHAGE) 500 MG Tab  Take 1,000 mg by mouth every day.             oxycodone-acetaminophen (PERCOCET) 5-325 MG Tab  Take 1-2 Tabs by mouth every 8 hours as needed.             Saccharomyces boulardii (PROBIOTIC) 250 MG Cap  Take 1 Cap by mouth 2 Times a Day.                 DISCHARGE VITALS, LABS and IMAGING  Filed Vitals:    04/27/17 1755 04/27/17 2000 04/28/17 0400 04/28/17 0735   BP:  144/67 146/74 145/77   Pulse:  85 68 70   Temp: 37.8 °C (100 °F) 37.7 °C (99.9 °F) 36.8 °C (98.2 °F) 37.6 °C (99.7 °F)   Resp:  18 18 16   Height:       Weight:       SpO2:  94% 96% 90%     Lab Results   Component Value Date/Time    WBC 7.7 04/28/2017 03:10 AM    RBC 4.35* 04/28/2017 03:10 AM    HEMOGLOBIN 13.4* 04/28/2017 03:10 AM    HEMATOCRIT 38.0* 04/28/2017 03:10 AM    MCV 87.4 04/28/2017 03:10 AM    MCH 30.8 04/28/2017 03:10 AM    MCHC 35.3 04/28/2017 03:10 AM    MPV 11.0 04/28/2017 03:10 AM    NEUTROPHILS-POLYS 72.10* 04/27/2017 03:06 AM    LYMPHOCYTES 14.70* 04/27/2017 03:06 AM    MONOCYTES 11.20 04/27/2017 03:06 AM    EOSINOPHILS 0.90 04/27/2017 03:06 AM    BASOPHILS 0.70 04/27/2017 03:06 AM      Lab Results   Component Value Date/Time    SODIUM 137 04/28/2017 03:10 AM    POTASSIUM 3.6 04/28/2017 03:10 AM    CHLORIDE 105 04/28/2017 03:10 AM    CO2 26 04/28/2017 03:10 AM    GLUCOSE 167* 04/28/2017 03:10 AM     BUN 11 04/28/2017 03:10 AM    CREATININE 0.82 04/28/2017 03:10 AM      No results found for: PROTHROMBTM, INR     No results found.    Please see discharge diagnoses, plan and follow up above for details of pending tests and postdischarge instructions for the clinic providers and specialists.    Please CC Assigned primary care physician at [facility]    For further details on discharge medications, patient education, diet, and activity, please refer to electronic copy of discharge instructions.       TIME SPENT: 38 minutes, with greater than 50% of the time spent on face-to-face encounter, addressing medical issues, coordination of care, counseling, discharge planning, medication reconciliation, and documentation.

## 2017-04-28 NOTE — PROGRESS NOTES
Assumed care of pt at shift change, report received from day shift. Pt A&Ox4. C/o headache, denies numbness or tingling. IV in place. Pt pleasant and cooperative. Refused bed alarm despite fall risk education, up-self, treaded socks on, call light within reach, bed in low position.

## 2017-04-28 NOTE — FACE TO FACE
Face to Face Note  -  Durable Medical Equipment    Rojelio Zhou M.D. - NPI: [id number]  I certify that this patient is under my care and that they had a durable medical equipment(DME)face to face encounter by myself that meets the physician DME face-to-face encounter requirements with this patient on:    Date of encounter:   Patient:                    MRN:                       YOB: 2015  Epi Cortés  [id number]  1965     The encounter with the patient was in whole, or in part, for the following medical condition, which is the primary reason for durable medical equipment:  Other - foot swelling cellulitis ankle ulcer    I certify that, based on my findings, the following durable medical equipment is medically necessary:  Crutches.    HOME O2 Saturation Measurements:(Values must be present for Home Oxygen orders)         ,     ,         My Clinical findings support the need for the above equipment due to:  Other - foot pain severe    Supporting Symptoms: foot pain

## 2017-04-28 NOTE — PROGRESS NOTES
Assessment completed. Pt sitting in semi-fowlers position in bed. Up per self and ambulates well. Reports pain in L ankle and requests IV morphine, states that this medication works well for him. Administered IV morphine 2mg SIVP as ordered. Pt reports that he is looking forward to going home soon. States he recently quit drinking and soon afterwards he became ill. Educated that alcohol can lower blood glucose levels and can be dangerous, masking signs of hypoglycemia, and alter metabolism that can affect diabetes glucose management in a dangerous way also. Educated on importance of maintaining good blood glucose control to prevent re-infection and re-hospitalization. Verbalizes understanding. IV ABX running, reports some latent burning in vein administration site. Educated that some latent burning is normal but if severe pain occurs report immediately and that when line flushes with NS afterwards the pain should subside. Verbalizes understanding. No s/s distress noted. BLS CTA. Pain controlled with IV morphine. Call light in reach. A&Ox4.

## 2017-04-29 NOTE — PROGRESS NOTES
Pt discharge home ambulatory with crutches via private vehicle with relative. Given prescriptions and instructions on ABX. Written and verbal Instructions on wound care, DM foot care, DM care, ABX-verbalized understanding all education. IV removed with cath tip intact and bleeding controlled, covered with gauze and tape bandage. Belongings accounted for and taken home with pt. No s/s distress noted. Instructed to follow up as ordered and follow ADA diet, monitor blood glucose. Verbalized understanding.

## 2017-04-29 NOTE — DISCHARGE INSTRUCTIONS
Discharge Instructions    Discharged to home by car with relative. Discharged via walking, hospital escort: Refused.  Special equipment needed: Crutches    Be sure to schedule a follow-up appointment with your primary care doctor or any specialists as instructed.     Discharge Plan:   Diet Plan: Discussed  Activity Level: Discussed  Confirmed Follow up Appointment: Appointment Scheduled  Confirmed Symptoms Management: Discussed  Medication Reconciliation Updated: Yes  Influenza Vaccine Indication: Not indicated: Previously immunized this influenza season and > 8 years of age    I understand that a diet low in cholesterol, fat, and sodium is recommended for good health. Unless I have been given specific instructions below for another diet, I accept this instruction as my diet prescription.   Other diet: Diabetic    Special Instructions: None    · Is patient discharged on Warfarin / Coumadin?   No     · Is patient Post Blood Transfusion?  No    Depression / Suicide Risk    As you are discharged from this [facility] facility, it is important to learn how to keep safe from harming yourself.    Recognize the warning signs:  · Abrupt changes in personality, positive or negative- including increase in energy   · Giving away possessions  · Change in eating patterns- significant weight changes-  positive or negative  · Change in sleeping patterns- unable to sleep or sleeping all the time   · Unwillingness or inability to communicate  · Depression  · Unusual sadness, discouragement and loneliness  · Talk of wanting to die  · Neglect of personal appearance   · Rebelliousness- reckless behavior  · Withdrawal from people/activities they love  · Confusion- inability to concentrate     If you or a loved one observes any of these behaviors or has concerns about self-harm, here's what you can do:  · Talk about it- your feelings and reasons for harming yourself  · Remove any means that you might use to hurt yourself (examples:  pills, rope, extension cords, firearm)  · Get professional help from the community (Mental Health, Substance Abuse, psychological counseling)  · Do not be alone:Call your Safe Contact- someone whom you trust who will be there for you.  · Call your local CRISIS HOTLINE 259-0124 or 258-219-3949  · Call your local Children's Mobile Crisis Response Team [place] (019) 155-0023 or www.F[employer]OVER  · Call the toll free National Suicide Prevention Hotlines   · National Suicide Prevention Lifeline 451-918-XAZF (2850)  · [employer] [facility] Line Network 800-SUICIDE (805-5767)  Type 2 Diabetes Mellitus, Adult  Type 2 diabetes mellitus is a long-term (chronic) disease. In type 2 diabetes:  The pancreas does not make enough of a hormone called insulin.  The cells in the body do not respond as well to the insulin that is made.  Both of the above can happen.  Normally, insulin moves sugars from food into tissue cells. This gives you energy. If you have type 2 diabetes, sugars cannot be moved into tissue cells. This causes high blood sugar (hyperglycemia).   Your doctors will set personal treatment goals for you based on your age, your medicines, how long you have had diabetes, and any other medical conditions you have. Generally, the goal of treatment is to maintain the following blood glucose levels:  Before meals (preprandial):  mg/dL.  After meals (postprandial): below 180 mg/dL.  A1c: less than 6.5-7%.  HOME CARE  Have your hemoglobin A1c level checked twice a year. The level shows if your diabetes is under control or out of control.  Test your blood sugar level every day as told by your doctor.  Check your ketone levels by testing your pee (urine) when you are sick and as told.  Take your diabetes or insulin medicine as told by your doctor.  Never run out of insulin.  Adjust how much insulin you give yourself based on how many carbs (carbohydrates) you eat. Carbs are in many foods, such as fruits, vegetables, whole  grains, and dairy products.  Have a healthy snack between every healthy meal. Have 3 meals and 3 snacks a day.  Lose weight if you are overweight.  Carry a medical alert card or wear your medical alert jewelry.  Carry a 15-gram carb snack with you at all times. Examples include:  Glucose pills, 3 or 4.  Glucose gel, 15-gram tube.  Raisins, 2 tablespoons (24 grams).  Jelly beans, 6.  Animal crackers, 8.  Regular (not diet) pop, 4 ounces (120 milliliters).  Gummy treats, 9.  Notice low blood sugar (hypoglycemia) symptoms, such as:  Shaking (tremors).  Trouble thinking clearly.  Sweating.  Faster heart rate.  Headache.  Dry mouth.  Hunger.  Crabbiness (irritability).  Being worried or tense (anxious).  Restless sleep.  A change in speech or coordination.  Confusion.  Treat low blood sugar right away. If you are alert and can swallow, follow the 15:15 rule:  Take 15-20 grams of a rapid-acting glucose or carb. This includes glucose gel, glucose pills, or 4 ounces (120 milliliters) of fruit juice, regular pop, or low-fat milk.  Check your blood sugar level 15 minutes after taking the glucose.  Take 15-20 grams more of glucose if the repeat blood sugar level is still 70 mg/dL (milligrams/deciliter) or below.  Eat a meal or snack within 1 hour of the blood sugar levels going back to normal.  Notice early symptoms of high blood sugar, such as:  Being really thirsty or drinking a lot (polydipsia).  Peeing a lot (polyuria).  Do at least 150 minutes of physical activity a week or as told.  Split the 150 minutes of activity up during the week. Do not do 150 minutes of activity in one day.  Perform exercises, such as weight lifting, at least 2 times a week or as told.  Spend no more than 90 minutes at one time inactive.  Adjust your insulin or food intake as needed if you start a new exercise or sport.  Follow your sick-day plan when you are not able to eat or drink as usual.  Do not smoke, chew tobacco, or use electronic  cigarettes.  Women who are not pregnant should drink no more than 1 drink a day. Men should drink no more than 2 drinks a day.  Only drink alcohol with food.  Ask your doctor if alcohol is safe for you.  Tell your doctor if you drink alcohol several times during the week.  See your doctor regularly.  Schedule an eye exam soon after you are told you have diabetes. Schedule exams once every year.  Check your skin and feet every day. Check for cuts, bruises, redness, nail problems, bleeding, blisters, or sores. A doctor should do a foot exam once a year.  Brush your teeth and gums twice a day. Floss once a day. Visit your dentist regularly.  Share your diabetes plan with your workplace or school.  Keep your shots that fight diseases (vaccines) up to date.  Get a flu (influenza) shot every year.  Get a pneumonia shot. If you are 65 years of age or older and you have never gotten a pneumonia shot, you might need to get two shots.  Ask your doctor which other shots you should get.  Learn how to deal with stress.  Get diabetes education and support as needed.  Ask your doctor for special help if:  You need help to maintain or improve how you do things on your own.  You need help to maintain or improve the quality of your life.  You have foot or hand problems.  You have trouble cleaning yourself, dressing, eating, or doing physical activity.  GET HELP IF:  You are unable to eat or drink for more than 6 hours.  You feel sick to your stomach (nauseous) or throw up (vomit) for more than 6 hours.  Your blood sugar level is over 240 mg/dL.  There is a change in mental status.  You get another serious illness.  You have watery poop (diarrhea) for more than 6 hours.  You have been sick or have had a fever for 2 or more days and are not getting better.  You have pain when you are active.  GET HELP RIGHT AWAY IF:  You have trouble breathing.  Your ketone levels are higher than your doctor says they should be.  MAKE SURE  YOU:  Understand these instructions.  Will watch your condition.  Will get help right away if you are not doing well or get worse.     This information is not intended to replace advice given to you by your health care provider. Make sure you discuss any questions you have with your health care provider.     Document Released: 09/26/2009 Document Revised: 05/03/2016 Document Reviewed: 07/19/2013  [employer] Interactive Patient Education ©2016 [employer]  Diabetes and Foot Care  Diabetes may cause you to have problems because of poor blood supply (circulation) to your feet and legs. This may cause the skin on your feet to become thinner, break easier, and heal more slowly. Your skin may become dry, and the skin may peel and crack. You may also have nerve damage in your legs and feet causing decreased feeling in them. You may not notice minor injuries to your feet that could lead to infections or more serious problems. Taking care of your feet is one of the most important things you can do for yourself.   HOME CARE INSTRUCTIONS  Wear shoes at all times, even in the house. Do not go barefoot. Bare feet are easily injured.  Check your feet daily for blisters, cuts, and redness. If you cannot see the bottom of your feet, use a mirror or ask someone for help.  Wash your feet with warm water (do not use hot water) and mild soap. Then pat your feet and the areas between your toes until they are completely dry. Do not soak your feet as this can dry your skin.  Apply a moisturizing lotion or petroleum jelly (that does not contain alcohol and is unscented) to the skin on your feet and to dry, brittle toenails. Do not apply lotion between your toes.  Trim your toenails straight across. Do not dig under them or around the cuticle. File the edges of your nails with an emery board or nail file.  Do not cut corns or calluses or try to remove them with medicine.  Wear clean socks or stockings every day. Make sure they are not too  tight. Do not wear knee-high stockings since they may decrease blood flow to your legs.  Wear shoes that fit properly and have enough cushioning. To break in new shoes, wear them for just a few hours a day. This prevents you from injuring your feet. Always look in your shoes before you put them on to be sure there are no objects inside.  Do not cross your legs. This may decrease the blood flow to your feet.  If you find a minor scrape, cut, or break in the skin on your feet, keep it and the skin around it clean and dry. These areas may be cleansed with mild soap and water. Do not cleanse the area with peroxide, alcohol, or iodine.  When you remove an adhesive bandage, be sure not to damage the skin around it.  If you have a wound, look at it several times a day to make sure it is healing.  Do not use heating pads or hot water bottles. They may burn your skin. If you have lost feeling in your feet or legs, you may not know it is happening until it is too late.  Make sure your health care provider performs a complete foot exam at least annually or more often if you have foot problems. Report any cuts, sores, or bruises to your health care provider immediately.  SEEK MEDICAL CARE IF:   You have an injury that is not healing.  You have cuts or breaks in the skin.  You have an ingrown nail.  You notice redness on your legs or feet.  You feel burning or tingling in your legs or feet.  You have pain or cramps in your legs and feet.  Your legs or feet are numb.  Your feet always feel cold.  SEEK IMMEDIATE MEDICAL CARE IF:   There is increasing redness, swelling, or pain in or around a wound.  There is a red line that goes up your leg.  Pus is coming from a wound.  You develop a fever or as directed by your health care provider.  You notice a bad smell coming from an ulcer or wound.     This information is not intended to replace advice given to you by your health care provider. Make sure you discuss any questions you have  with your health care provider.     Document Released: 12/15/2001 Document Revised: 08/20/2014 Document Reviewed: 05/27/2014  [employer] Interactive Patient Education ©2016 [employer].  Cellulitis  Cellulitis is an infection of the skin and the tissue under the skin. The infected area is usually red and tender. This happens most often in the arms and lower legs.  HOME CARE   · Take your antibiotic medicine as told. Finish the medicine even if you start to feel better.  · Keep the infected arm or leg raised (elevated).  · Put a warm cloth on the area up to 4 times per day.  · Only take medicines as told by your doctor.  · Keep all doctor visits as told.  GET HELP IF:  · You see red streaks on the skin coming from the infected area.  · Your red area gets bigger or turns a dark color.  · Your bone or joint under the infected area is painful after the skin heals.  · Your infection comes back in the same area or different area.  · You have a puffy (swollen) bump in the infected area.  · You have new symptoms.  · You have a fever.  GET HELP RIGHT AWAY IF:   · You feel very sleepy.  · You throw up (vomit) or have watery poop (diarrhea).  · You feel sick and have muscle aches and pains.  MAKE SURE YOU:   · Understand these instructions.  · Will watch your condition.  · Will get help right away if you are not doing well or get worse.     This information is not intended to replace advice given to you by your health care provider. Make sure you discuss any questions you have with your health care provider.     Document Released: 06/05/2009 Document Revised: 01/08/2016 Document Reviewed: 03/04/2013  [employer] Interactive Patient Education ©2016 [employer].    Antibiotic Medication  Antibiotic medicine helps fight germs. Germs cause infections. This type of medicine will not work for colds, flu, or other viral infections. Tell your doctor if you:  · Are allergic to any medicines.  · Are pregnant or are trying to get  pregnant.  · Are taking other medicines.  · Have other medical problems.  HOME CARE  · Take your medicine with a glass of water or food as told by your doctor.  · Take the medicine as told. Finish them even if you start to feel better.  · Do not give your medicine to other people.  · Do not use your medicine in the future for a different infection.  · Ask your doctor about which side effects to watch for.  · Try not to miss any doses. If you miss a dose, take it as soon as possible. If it is almost time for your next dose, and your dosing schedule is:  ¨ Two doses a day, take the missed dose and the next dose 5 to 6 hours later.  ¨ Three or more doses a day, take the missed dose and the next dose 2 to 4 hours later, or double your next dose.  ¨ Then go back to your normal schedule.  GET HELP RIGHT AWAY IF:   · You get worse or do not get better within a few days.  · The medicine makes you sick.  · You develop a rash or any other side effects.  · You have questions or concerns.  MAKE SURE YOU:  · Understand these instructions.  · Will watch your condition.  · Will get help right away if you are not doing well or get worse.     This information is not intended to replace advice given to you by your health care provider. Make sure you discuss any questions you have with your health care provider.     Document Released: 09/26/2009 Document Revised: 03/11/2013 Document Reviewed: 11/23/2010  [employer] Interactive Patient Education ©2016 [employer]

## 2017-04-30 LAB
BACTERIA WND AEROBE CULT: NORMAL
GRAM STN SPEC: NORMAL
SIGNIFICANT IND 70042: NORMAL
SITE SITE: NORMAL
SOURCE SOURCE: NORMAL

## 2018-05-28 ENCOUNTER — OFFICE VISIT (OUTPATIENT)
Dept: URGENT CARE | Facility: PHYSICIAN GROUP | Age: 53
End: 2018-05-28
Payer: COMMERCIAL

## 2018-05-28 VITALS
SYSTOLIC BLOOD PRESSURE: 168 MMHG | BODY MASS INDEX: 34.16 KG/M2 | HEIGHT: 65 IN | OXYGEN SATURATION: 94 % | TEMPERATURE: 98.4 F | HEART RATE: 67 BPM | DIASTOLIC BLOOD PRESSURE: 92 MMHG | WEIGHT: 205 LBS

## 2018-05-28 DIAGNOSIS — M79.602 PAIN IN BOTH UPPER EXTREMITIES: ICD-10-CM

## 2018-05-28 DIAGNOSIS — M79.601 PAIN IN BOTH UPPER EXTREMITIES: ICD-10-CM

## 2018-05-28 DIAGNOSIS — M54.2 NECK PAIN: ICD-10-CM

## 2018-05-28 PROCEDURE — 99203 OFFICE O/P NEW LOW 30 MIN: CPT | Performed by: FAMILY MEDICINE

## 2018-05-28 RX ORDER — CYCLOBENZAPRINE HCL 10 MG
10 TABLET ORAL 3 TIMES DAILY PRN
Qty: 20 TAB | Refills: 0 | Status: SHIPPED | OUTPATIENT
Start: 2018-05-28 | End: 2019-01-08

## 2018-05-28 RX ORDER — RANITIDINE 150 MG/1
150 TABLET ORAL DAILY
Refills: 12 | COMMUNITY

## 2018-05-28 RX ORDER — KETOROLAC TROMETHAMINE 30 MG/ML
60 INJECTION, SOLUTION INTRAMUSCULAR; INTRAVENOUS ONCE
Status: COMPLETED | OUTPATIENT
Start: 2018-05-28 | End: 2018-05-28

## 2018-05-28 RX ADMIN — KETOROLAC TROMETHAMINE 60 MG: 30 INJECTION, SOLUTION INTRAMUSCULAR; INTRAVENOUS at 17:23

## 2018-05-29 ASSESSMENT — ENCOUNTER SYMPTOMS
SORE THROAT: 0
BACK PAIN: 0
WEIGHT LOSS: 0
EYE DISCHARGE: 0
EYE REDNESS: 0
SENSORY CHANGE: 0

## 2018-05-29 NOTE — PROGRESS NOTES
"Subjective:      Epi Cortés is a 52 y.o. male who presents with Arm Pain (bilateral arm pain x1 week, also has pain with rotation of the neck)            1 week neck pain and bilateral upper extremity pain. \"feels like muscles\". Extremities feel weak. No HA. No fever. No SH IVDA. +PMH multiple caries and dental infections. No vision loss. No mental status change. No trauma or clear trigger. Min improvement with 800mg ibuprofen. No other aggravating or alleviating factors.            Review of Systems   Constitutional: Negative for malaise/fatigue and weight loss.   HENT: Negative for sore throat.    Eyes: Negative for discharge and redness.   Musculoskeletal: Negative for back pain.   Skin: Negative for itching and rash.   Neurological: Negative for sensory change.   .  Medications, Allergies, and current problem list reviewed today in Epic         Objective:     BP (!) 168/92   Pulse 67   Temp 36.9 °C (98.4 °F)   Ht 1.651 m (5' 5\")   Wt 93 kg (205 lb)   SpO2 94%   BMI 34.11 kg/m²      Physical Exam   Constitutional: He appears well-developed and well-nourished. No distress.   HENT:   Head: Normocephalic and atraumatic.   Poor dentition, multiple caries   Neck: Neck supple.   Cardiovascular: Normal rate, regular rhythm and normal heart sounds.    No murmur heard.  Pulmonary/Chest: Effort normal and breath sounds normal. He has no wheezes.   Lymphadenopathy:     He has no cervical adenopathy.   Neurological:   =  No focal deficits   Skin: Skin is warm and dry. No rash noted.               Assessment/Plan:     1. Neck pain  ketorolac (TORADOL) injection 60 mg    cyclobenzaprine (FLEXERIL) 10 MG Tab   2. Pain in both upper extremities  ketorolac (TORADOL) injection 60 mg     Differential diagnosis, natural history, supportive care, and indications for immediate follow-up discussed at length.     Initial neuro exam revealed bilateral UE weakness however repeat exam with full strength. Initially I felt " epidural abscess needed to be ruled out however phone f/u with patient reveals he is doing better the day after he presented. He understands to contact me with recurrence of weakness or fever.

## 2019-01-08 ENCOUNTER — APPOINTMENT (OUTPATIENT)
Dept: RADIOLOGY | Facility: MEDICAL CENTER | Age: 54
DRG: 603 | End: 2019-01-08
Attending: INTERNAL MEDICINE
Payer: COMMERCIAL

## 2019-01-08 ENCOUNTER — HOSPITAL ENCOUNTER (INPATIENT)
Facility: MEDICAL CENTER | Age: 54
LOS: 4 days | DRG: 603 | End: 2019-01-12
Attending: EMERGENCY MEDICINE | Admitting: INTERNAL MEDICINE
Payer: COMMERCIAL

## 2019-01-08 DIAGNOSIS — L03.116 CELLULITIS OF LEFT LOWER EXTREMITY: ICD-10-CM

## 2019-01-08 PROBLEM — E78.49 OTHER HYPERLIPIDEMIA: Status: ACTIVE | Noted: 2017-04-27

## 2019-01-08 LAB
ALBUMIN SERPL BCP-MCNC: 3.9 G/DL (ref 3.2–4.9)
ALBUMIN/GLOB SERPL: 1.1 G/DL
ALP SERPL-CCNC: 76 U/L (ref 30–99)
ALT SERPL-CCNC: 21 U/L (ref 2–50)
ANION GAP SERPL CALC-SCNC: 12 MMOL/L (ref 0–11.9)
AST SERPL-CCNC: 11 U/L (ref 12–45)
BASOPHILS # BLD AUTO: 0.6 % (ref 0–1.8)
BASOPHILS # BLD: 0.08 K/UL (ref 0–0.12)
BILIRUB SERPL-MCNC: 0.9 MG/DL (ref 0.1–1.5)
BUN SERPL-MCNC: 19 MG/DL (ref 8–22)
CALCIUM SERPL-MCNC: 9.5 MG/DL (ref 8.5–10.5)
CHLORIDE SERPL-SCNC: 103 MMOL/L (ref 96–112)
CO2 SERPL-SCNC: 24 MMOL/L (ref 20–33)
CREAT SERPL-MCNC: 1.07 MG/DL (ref 0.5–1.4)
CRP SERPL HS-MCNC: 24.99 MG/DL (ref 0–0.75)
EOSINOPHIL # BLD AUTO: 0.11 K/UL (ref 0–0.51)
EOSINOPHIL NFR BLD: 0.8 % (ref 0–6.9)
ERYTHROCYTE [DISTWIDTH] IN BLOOD BY AUTOMATED COUNT: 40.3 FL (ref 35.9–50)
ERYTHROCYTE [SEDIMENTATION RATE] IN BLOOD BY WESTERGREN METHOD: 82 MM/HOUR (ref 0–20)
GLOBULIN SER CALC-MCNC: 3.7 G/DL (ref 1.9–3.5)
GLUCOSE BLD-MCNC: 205 MG/DL (ref 65–99)
GLUCOSE BLD-MCNC: 212 MG/DL (ref 65–99)
GLUCOSE SERPL-MCNC: 203 MG/DL (ref 65–99)
HCT VFR BLD AUTO: 42.6 % (ref 42–52)
HGB BLD-MCNC: 15.2 G/DL (ref 14–18)
IMM GRANULOCYTES # BLD AUTO: 0.04 K/UL (ref 0–0.11)
IMM GRANULOCYTES NFR BLD AUTO: 0.3 % (ref 0–0.9)
LACTATE BLD-SCNC: 1.3 MMOL/L (ref 0.5–2)
LYMPHOCYTES # BLD AUTO: 1.32 K/UL (ref 1–4.8)
LYMPHOCYTES NFR BLD: 10 % (ref 22–41)
MCH RBC QN AUTO: 32 PG (ref 27–33)
MCHC RBC AUTO-ENTMCNC: 35.7 G/DL (ref 33.7–35.3)
MCV RBC AUTO: 89.7 FL (ref 81.4–97.8)
MONOCYTES # BLD AUTO: 0.96 K/UL (ref 0–0.85)
MONOCYTES NFR BLD AUTO: 7.3 % (ref 0–13.4)
NEUTROPHILS # BLD AUTO: 10.63 K/UL (ref 1.82–7.42)
NEUTROPHILS NFR BLD: 81 % (ref 44–72)
NRBC # BLD AUTO: 0 K/UL
NRBC BLD-RTO: 0 /100 WBC
PLATELET # BLD AUTO: 246 K/UL (ref 164–446)
PMV BLD AUTO: 11.2 FL (ref 9–12.9)
POTASSIUM SERPL-SCNC: 3.8 MMOL/L (ref 3.6–5.5)
PROT SERPL-MCNC: 7.6 G/DL (ref 6–8.2)
RBC # BLD AUTO: 4.75 M/UL (ref 4.7–6.1)
SODIUM SERPL-SCNC: 139 MMOL/L (ref 135–145)
WBC # BLD AUTO: 13.1 K/UL (ref 4.8–10.8)

## 2019-01-08 PROCEDURE — 87040 BLOOD CULTURE FOR BACTERIA: CPT

## 2019-01-08 PROCEDURE — 96366 THER/PROPH/DIAG IV INF ADDON: CPT

## 2019-01-08 PROCEDURE — 93971 EXTREMITY STUDY: CPT | Mod: RT

## 2019-01-08 PROCEDURE — 96375 TX/PRO/DX INJ NEW DRUG ADDON: CPT

## 2019-01-08 PROCEDURE — 304561 HCHG STAT O2

## 2019-01-08 PROCEDURE — 96372 THER/PROPH/DIAG INJ SC/IM: CPT

## 2019-01-08 PROCEDURE — 700111 HCHG RX REV CODE 636 W/ 250 OVERRIDE (IP)

## 2019-01-08 PROCEDURE — 86140 C-REACTIVE PROTEIN: CPT

## 2019-01-08 PROCEDURE — 700111 HCHG RX REV CODE 636 W/ 250 OVERRIDE (IP): Performed by: INTERNAL MEDICINE

## 2019-01-08 PROCEDURE — 96367 TX/PROPH/DG ADDL SEQ IV INF: CPT

## 2019-01-08 PROCEDURE — 700111 HCHG RX REV CODE 636 W/ 250 OVERRIDE (IP): Performed by: EMERGENCY MEDICINE

## 2019-01-08 PROCEDURE — 700102 HCHG RX REV CODE 250 W/ 637 OVERRIDE(OP): Performed by: INTERNAL MEDICINE

## 2019-01-08 PROCEDURE — 80053 COMPREHEN METABOLIC PANEL: CPT

## 2019-01-08 PROCEDURE — 96365 THER/PROPH/DIAG IV INF INIT: CPT

## 2019-01-08 PROCEDURE — 770006 HCHG ROOM/CARE - MED/SURG/GYN SEMI*

## 2019-01-08 PROCEDURE — 99222 1ST HOSP IP/OBS MODERATE 55: CPT | Mod: AI | Performed by: INTERNAL MEDICINE

## 2019-01-08 PROCEDURE — 82962 GLUCOSE BLOOD TEST: CPT

## 2019-01-08 PROCEDURE — 85652 RBC SED RATE AUTOMATED: CPT

## 2019-01-08 PROCEDURE — 96376 TX/PRO/DX INJ SAME DRUG ADON: CPT

## 2019-01-08 PROCEDURE — 73564 X-RAY EXAM KNEE 4 OR MORE: CPT | Mod: RT

## 2019-01-08 PROCEDURE — 36415 COLL VENOUS BLD VENIPUNCTURE: CPT

## 2019-01-08 PROCEDURE — 83605 ASSAY OF LACTIC ACID: CPT

## 2019-01-08 PROCEDURE — 99285 EMERGENCY DEPT VISIT HI MDM: CPT

## 2019-01-08 PROCEDURE — 85025 COMPLETE CBC W/AUTO DIFF WBC: CPT

## 2019-01-08 PROCEDURE — A9270 NON-COVERED ITEM OR SERVICE: HCPCS | Performed by: INTERNAL MEDICINE

## 2019-01-08 PROCEDURE — 700105 HCHG RX REV CODE 258: Performed by: EMERGENCY MEDICINE

## 2019-01-08 RX ORDER — LISINOPRIL 10 MG/1
10 TABLET ORAL DAILY
Status: DISCONTINUED | OUTPATIENT
Start: 2019-01-09 | End: 2019-01-12 | Stop reason: HOSPADM

## 2019-01-08 RX ORDER — MORPHINE SULFATE 4 MG/ML
4 INJECTION, SOLUTION INTRAMUSCULAR; INTRAVENOUS ONCE
Status: COMPLETED | OUTPATIENT
Start: 2019-01-08 | End: 2019-01-08

## 2019-01-08 RX ORDER — MORPHINE SULFATE 4 MG/ML
2 INJECTION, SOLUTION INTRAMUSCULAR; INTRAVENOUS
Status: DISCONTINUED | OUTPATIENT
Start: 2019-01-08 | End: 2019-01-12 | Stop reason: HOSPADM

## 2019-01-08 RX ORDER — OXYCODONE HYDROCHLORIDE 5 MG/1
2.5 TABLET ORAL
Status: DISCONTINUED | OUTPATIENT
Start: 2019-01-08 | End: 2019-01-12 | Stop reason: HOSPADM

## 2019-01-08 RX ORDER — OXYCODONE HYDROCHLORIDE 5 MG/1
5 TABLET ORAL
Status: DISCONTINUED | OUTPATIENT
Start: 2019-01-08 | End: 2019-01-12 | Stop reason: HOSPADM

## 2019-01-08 RX ORDER — DEXTROSE MONOHYDRATE 25 G/50ML
25 INJECTION, SOLUTION INTRAVENOUS
Status: DISCONTINUED | OUTPATIENT
Start: 2019-01-08 | End: 2019-01-08

## 2019-01-08 RX ORDER — ONDANSETRON 4 MG/1
4 TABLET, ORALLY DISINTEGRATING ORAL EVERY 4 HOURS PRN
Status: DISCONTINUED | OUTPATIENT
Start: 2019-01-08 | End: 2019-01-12 | Stop reason: HOSPADM

## 2019-01-08 RX ORDER — ONDANSETRON 2 MG/ML
4 INJECTION INTRAMUSCULAR; INTRAVENOUS ONCE
Status: COMPLETED | OUTPATIENT
Start: 2019-01-08 | End: 2019-01-08

## 2019-01-08 RX ORDER — ONDANSETRON 2 MG/ML
4 INJECTION INTRAMUSCULAR; INTRAVENOUS EVERY 4 HOURS PRN
Status: DISCONTINUED | OUTPATIENT
Start: 2019-01-08 | End: 2019-01-12 | Stop reason: HOSPADM

## 2019-01-08 RX ORDER — HEPARIN SODIUM 5000 [USP'U]/ML
5000 INJECTION, SOLUTION INTRAVENOUS; SUBCUTANEOUS EVERY 8 HOURS
Status: DISCONTINUED | OUTPATIENT
Start: 2019-01-08 | End: 2019-01-12 | Stop reason: HOSPADM

## 2019-01-08 RX ORDER — INSULIN GLARGINE 100 [IU]/ML
40 INJECTION, SOLUTION SUBCUTANEOUS NIGHTLY
Status: DISCONTINUED | OUTPATIENT
Start: 2019-01-08 | End: 2019-01-12 | Stop reason: HOSPADM

## 2019-01-08 RX ORDER — ENALAPRILAT 1.25 MG/ML
1.25 INJECTION INTRAVENOUS EVERY 6 HOURS PRN
Status: DISCONTINUED | OUTPATIENT
Start: 2019-01-08 | End: 2019-01-12 | Stop reason: HOSPADM

## 2019-01-08 RX ORDER — DEXTROSE MONOHYDRATE 25 G/50ML
25 INJECTION, SOLUTION INTRAVENOUS
Status: DISCONTINUED | OUTPATIENT
Start: 2019-01-08 | End: 2019-01-12 | Stop reason: HOSPADM

## 2019-01-08 RX ORDER — MORPHINE SULFATE 4 MG/ML
4 INJECTION, SOLUTION INTRAMUSCULAR; INTRAVENOUS
Status: DISCONTINUED | OUTPATIENT
Start: 2019-01-08 | End: 2019-01-08

## 2019-01-08 RX ORDER — MORPHINE SULFATE 4 MG/ML
INJECTION, SOLUTION INTRAMUSCULAR; INTRAVENOUS
Status: COMPLETED
Start: 2019-01-08 | End: 2019-01-08

## 2019-01-08 RX ORDER — ACETAMINOPHEN 325 MG/1
650 TABLET ORAL EVERY 6 HOURS PRN
Status: DISCONTINUED | OUTPATIENT
Start: 2019-01-08 | End: 2019-01-12 | Stop reason: HOSPADM

## 2019-01-08 RX ORDER — ATORVASTATIN CALCIUM 10 MG/1
10 TABLET, FILM COATED ORAL NIGHTLY
Status: DISCONTINUED | OUTPATIENT
Start: 2019-01-08 | End: 2019-01-12 | Stop reason: HOSPADM

## 2019-01-08 RX ADMIN — MORPHINE SULFATE 2 MG: 4 INJECTION INTRAVENOUS at 20:52

## 2019-01-08 RX ADMIN — INSULIN LISPRO 5 UNITS: 100 INJECTION, SOLUTION INTRAVENOUS; SUBCUTANEOUS at 20:57

## 2019-01-08 RX ADMIN — VANCOMYCIN HYDROCHLORIDE 2200 MG: 100 INJECTION, POWDER, LYOPHILIZED, FOR SOLUTION INTRAVENOUS at 15:10

## 2019-01-08 RX ADMIN — ONDANSETRON 4 MG: 2 INJECTION INTRAMUSCULAR; INTRAVENOUS at 18:07

## 2019-01-08 RX ADMIN — MORPHINE SULFATE 4 MG: 4 INJECTION INTRAVENOUS at 15:15

## 2019-01-08 RX ADMIN — ONDANSETRON 4 MG: 2 INJECTION INTRAMUSCULAR; INTRAVENOUS at 14:29

## 2019-01-08 RX ADMIN — ATORVASTATIN CALCIUM 10 MG: 10 TABLET, FILM COATED ORAL at 21:04

## 2019-01-08 RX ADMIN — MORPHINE SULFATE 4 MG: 4 INJECTION INTRAVENOUS at 18:07

## 2019-01-08 RX ADMIN — INSULIN GLARGINE 40 UNITS: 100 INJECTION, SOLUTION SUBCUTANEOUS at 21:00

## 2019-01-08 RX ADMIN — HEPARIN SODIUM 5000 UNITS: 5000 INJECTION, SOLUTION INTRAVENOUS; SUBCUTANEOUS at 23:41

## 2019-01-08 RX ADMIN — MORPHINE SULFATE 4 MG: 4 INJECTION INTRAVENOUS at 14:29

## 2019-01-08 RX ADMIN — MORPHINE SULFATE 4 MG: 4 INJECTION, SOLUTION INTRAMUSCULAR; INTRAVENOUS at 15:15

## 2019-01-08 RX ADMIN — SODIUM CHLORIDE 3 G: 900 INJECTION INTRAVENOUS at 14:32

## 2019-01-08 ASSESSMENT — ENCOUNTER SYMPTOMS
DEPRESSION: 0
LOSS OF CONSCIOUSNESS: 0
NERVOUS/ANXIOUS: 0
BLOOD IN STOOL: 0
FEVER: 0
DIZZINESS: 0
SINUS PAIN: 0
WHEEZING: 0
BLURRED VISION: 0
SPEECH CHANGE: 0
FLANK PAIN: 0
DOUBLE VISION: 0
WEIGHT LOSS: 0
HEADACHES: 0
CHILLS: 0
FOCAL WEAKNESS: 0
ORTHOPNEA: 0
MYALGIAS: 1
NAUSEA: 1
TREMORS: 0
SPUTUM PRODUCTION: 0
HEARTBURN: 0
EYE PAIN: 0
INSOMNIA: 0
FALLS: 0
SEIZURES: 0
HEMOPTYSIS: 0
CLAUDICATION: 0
VOMITING: 0
DIARRHEA: 0
PALPITATIONS: 0
TINGLING: 0
WEAKNESS: 1
STRIDOR: 0
ABDOMINAL PAIN: 0
SENSORY CHANGE: 0
PND: 0
MEMORY LOSS: 0
EYE REDNESS: 0
COUGH: 0
SORE THROAT: 0
SHORTNESS OF BREATH: 0
HALLUCINATIONS: 0
EYE DISCHARGE: 0
NECK PAIN: 0
CONSTIPATION: 0
BACK PAIN: 0
DIAPHORESIS: 0

## 2019-01-08 ASSESSMENT — COPD QUESTIONNAIRES
DO YOU EVER COUGH UP ANY MUCUS OR PHLEGM?: NO/ONLY WITH OCCASIONAL COLDS OR INFECTIONS
HAVE YOU SMOKED AT LEAST 100 CIGARETTES IN YOUR ENTIRE LIFE: NO/DON'T KNOW
COPD SCREENING SCORE: 1
DURING THE PAST 4 WEEKS HOW MUCH DID YOU FEEL SHORT OF BREATH: NONE/LITTLE OF THE TIME

## 2019-01-08 ASSESSMENT — PAIN SCALES - GENERAL: PAINLEVEL_OUTOF10: 6

## 2019-01-08 ASSESSMENT — LIFESTYLE VARIABLES
SUBSTANCE_ABUSE: 0
EVER_SMOKED: NEVER

## 2019-01-08 NOTE — ED TRIAGE NOTES
Chief Complaint   Patient presents with   • Knee Pain     redness/swelling   pt states onset Friday morning. Pt using crutches. Denies injury.

## 2019-01-08 NOTE — ED PROVIDER NOTES
ED Provider Note    Scribed for Dr. Mich Moralez M.D. by Chad Ortiz. 1/8/2019  1:53 PM    Primary care provider: Pcp Pt States None  Means of arrival: Walk in  History obtained from: Patient  History limited by: None    CHIEF COMPLAINT  Chief Complaint   Patient presents with   • Knee Pain     redness/swelling       [facility]  Epi Cortés is a 53 y.o. male who presents to the Emergency Department for evaluation of right knee pain onset 4 days ago. He reports that it radiates just superior and inferior to the knee. The patient confirms nausea, but denies any fever, chills, and body aches. The patient reports that he has taken ibuprofen for pain without relief. He reports that he had something similar several years ago in his ankle. He was hospitalized and given antibiotics for treatment. He has a history of Diabetes, for which he takes insulin and it has been stable. However, he has not checked it in the past several days. The patient denies any gout.    REVIEW OF SYSTEMS  Pertinent positives include knee pain and nausea. Pertinent negatives include no fever, chills, and body aches. As above, all other systems reviewed and are negative.   See HPI for further details.     PAST MEDICAL HISTORY   has a past medical history of Diabetes (HCC) and Hypertension.    SURGICAL HISTORY  patient denies any surgical history    SOCIAL HISTORY  Social History   Substance Use Topics   • Smoking status: Never Smoker   • Smokeless tobacco: Never Used   • Alcohol use Yes      Comment: occ      History   Drug Use No       FAMILY HISTORY  History reviewed. No pertinent family history.    CURRENT MEDICATIONS  Home Medications     Reviewed by Laury De Leon R.N. (Registered Nurse) on 01/08/19 at 1305  Med List Status: Partial   Medication Last Dose Status   acetaminophen (TYLENOL) 325 MG Tab  Active   atorvastatin (LIPITOR) 10 MG Tab  Active   cyclobenzaprine (FLEXERIL) 10 MG Tab  Active   Docusate Sodium 100 MG Tab  Active  "  insulin aspart (NOVOLOG) 100 UNIT/ML Solution  Active   insulin glargine (LANTUS) 100 UNIT/ML Solution  Active   lisinopril (PRINIVIL) 10 MG Tab  Active   metformin (GLUCOPHAGE) 500 MG Tab  Active   oxycodone-acetaminophen (PERCOCET) 5-325 MG Tab  Active   raNITidine (ZANTAC) 150 MG Tab  Active   Saccharomyces boulardii (PROBIOTIC) 250 MG Cap  Active                ALLERGIES  No Known Allergies    PHYSICAL EXAM  VITAL SIGNS: BP (!) 164/106   Pulse 95   Temp 36.6 °C (97.8 °F) (Temporal)   Resp 17   Ht 1.651 m (5' 5\")   Wt 88 kg (194 lb 0.1 oz)   SpO2 96%   BMI 32.28 kg/m²     Constitutional: Well developed, Well nourished, in mild distress distress, Non-toxic appearance.   HENT: Normocephalic, Atraumatic, Bilateral external ears normal, Oropharynx moist, No oral exudates.   Eyes: PERRLA, EOMI, Conjunctiva normal, No discharge.   Neck: No tenderness, Supple, No stridor.   Lymphatic: No lymphadenopathy noted.   Cardiovascular: Normal heart rate, Normal rhythm.   Thorax & Lungs: Clear to auscultation bilaterally, No respiratory distress, No wheezing, No crackles.   Abdomen: Soft, No tenderness, No masses, No pulsatile masses.   Skin: Warm, Dry, No rash. Redness, warmth and tenderness around the right knee 2/3 the way down the lower leg of the anterior portion, no calf tenderness  Extremities:, No edema No cyanosis.   Musculoskeletal: No tenderness to palpation or major deformities noted.  Intact distal pulses  Neurologic: Awake, alert. Moves all extremities spontaneously.  Psychiatric: Affect normal, Judgment normal, Mood normal.     LABS  Results for orders placed or performed during the hospital encounter of 01/08/19   CBC WITH DIFFERENTIAL   Result Value Ref Range    WBC 13.1 (H) 4.8 - 10.8 K/uL    RBC 4.75 4.70 - 6.10 M/uL    Hemoglobin 15.2 14.0 - 18.0 g/dL    Hematocrit 42.6 42.0 - 52.0 %    MCV 89.7 81.4 - 97.8 fL    MCH 32.0 27.0 - 33.0 pg    MCHC 35.7 (H) 33.7 - 35.3 g/dL    RDW 40.3 35.9 - 50.0 fL    " Platelet Count 246 164 - 446 K/uL    MPV 11.2 9.0 - 12.9 fL    Neutrophils-Polys 81.00 (H) 44.00 - 72.00 %    Lymphocytes 10.00 (L) 22.00 - 41.00 %    Monocytes 7.30 0.00 - 13.40 %    Eosinophils 0.80 0.00 - 6.90 %    Basophils 0.60 0.00 - 1.80 %    Immature Granulocytes 0.30 0.00 - 0.90 %    Nucleated RBC 0.00 /100 WBC    Neutrophils (Absolute) 10.63 (H) 1.82 - 7.42 K/uL    Lymphs (Absolute) 1.32 1.00 - 4.80 K/uL    Monos (Absolute) 0.96 (H) 0.00 - 0.85 K/uL    Eos (Absolute) 0.11 0.00 - 0.51 K/uL    Baso (Absolute) 0.08 0.00 - 0.12 K/uL    Immature Granulocytes (abs) 0.04 0.00 - 0.11 K/uL    NRBC (Absolute) 0.00 K/uL   COMP METABOLIC PANEL   Result Value Ref Range    Sodium 139 135 - 145 mmol/L    Potassium 3.8 3.6 - 5.5 mmol/L    Chloride 103 96 - 112 mmol/L    Co2 24 20 - 33 mmol/L    Anion Gap 12.0 (H) 0.0 - 11.9    Glucose 203 (H) 65 - 99 mg/dL    Bun 19 8 - 22 mg/dL    Creatinine 1.07 0.50 - 1.40 mg/dL    Calcium 9.5 8.5 - 10.5 mg/dL    AST(SGOT) 11 (L) 12 - 45 U/L    ALT(SGPT) 21 2 - 50 U/L    Alkaline Phosphatase 76 30 - 99 U/L    Total Bilirubin 0.9 0.1 - 1.5 mg/dL    Albumin 3.9 3.2 - 4.9 g/dL    Total Protein 7.6 6.0 - 8.2 g/dL    Globulin 3.7 (H) 1.9 - 3.5 g/dL    A-G Ratio 1.1 g/dL   LACTIC ACID   Result Value Ref Range    Lactic Acid 1.3 0.5 - 2.0 mmol/L   ESTIMATED GFR   Result Value Ref Range    GFR If African American >60 >60 mL/min/1.73 m 2    GFR If Non African American >60 >60 mL/min/1.73 m 2     All labs reviewed by me.    COURSE & MEDICAL DECISION MAKING  Pertinent Labs & Imaging studies reviewed. (See chart for details)    1:53 PM - Patient seen and examined at bedside. Patient will be treated with Unasyn 3 g. Ordered CBC, CMP, Blood culture x2 and Lactic acid to evaluate his symptoms. The differential diagnoses include but are not limited to: Cellulitis, gout, DVT. I informed the patient that he likely has cellulitis and will need to undergo labs for evaluation.    2:15 PM Ordered estimated  GFR for evaluation.    2:27 PM Patient treated with Morphine 4 mg and Zofran 4 mg.    3:13 PM Patient treated with Morphine 4 mg.    3:48 PM Hospitalist paged.    3:53 PM Dr. Goyal, Hospitalist consulted and agrees to admit the patient.    4:12 PM I reevaluated the patient and he was resting in bed. I informed him of his lab results and that he will be admitted for evaluation. The patient understands and agrees to admission.    6:08 PM Orthopedics paged.    6:20 PM Dr. Alberts, Orthopedics, consulted and agrees to evaluate the patient.    Decision Making:  Patient with apparent cellulitis of the right leg there is some concern for possibility of septic joint.  Orthopedic consultation requested for further evaluation.  I would be reluctant to perform an arthrocentesis as this would be in the area of cellulitis.    DISPOSITION:  Patient will be admitted to Dr. Goyal Hospitalist in guarded condition.    FINAL IMPRESSION  1. Cellulitis of left lower extremity          IChad (Courtney), am scribing for, and in the presence of, Mich Moralez M.D..    Electronically signed by: Chad Ortiz (Courtney), 1/8/2019    IMich M.D. personally performed the services described in this documentation, as scribed by Chad Ortiz in my presence, and it is both accurate and complete. C    The note accurately reflects work and decisions made by me.  Mich Moralez  1/8/2019  6:27 PM

## 2019-01-09 PROBLEM — D72.829 LEUCOCYTOSIS: Status: ACTIVE | Noted: 2019-01-09

## 2019-01-09 LAB
GLUCOSE BLD-MCNC: 150 MG/DL (ref 65–99)
GLUCOSE BLD-MCNC: 165 MG/DL (ref 65–99)
GLUCOSE BLD-MCNC: 198 MG/DL (ref 65–99)
GLUCOSE BLD-MCNC: 219 MG/DL (ref 65–99)
GLUCOSE BLD-MCNC: 261 MG/DL (ref 65–99)
GRAM STN SPEC: NORMAL
SIGNIFICANT IND 70042: NORMAL
SITE SITE: NORMAL
SOURCE SOURCE: NORMAL

## 2019-01-09 PROCEDURE — 82962 GLUCOSE BLOOD TEST: CPT | Mod: 91

## 2019-01-09 PROCEDURE — 97162 PT EVAL MOD COMPLEX 30 MIN: CPT

## 2019-01-09 PROCEDURE — 99233 SBSQ HOSP IP/OBS HIGH 50: CPT | Performed by: INTERNAL MEDICINE

## 2019-01-09 PROCEDURE — A9270 NON-COVERED ITEM OR SERVICE: HCPCS | Performed by: INTERNAL MEDICINE

## 2019-01-09 PROCEDURE — 0S9C3ZX DRAINAGE OF RIGHT KNEE JOINT, PERCUTANEOUS APPROACH, DIAGNOSTIC: ICD-10-PCS | Performed by: ORTHOPAEDIC SURGERY

## 2019-01-09 PROCEDURE — 700101 HCHG RX REV CODE 250: Performed by: ORTHOPAEDIC SURGERY

## 2019-01-09 PROCEDURE — 770006 HCHG ROOM/CARE - MED/SURG/GYN SEMI*

## 2019-01-09 PROCEDURE — 87070 CULTURE OTHR SPECIMN AEROBIC: CPT

## 2019-01-09 PROCEDURE — 87205 SMEAR GRAM STAIN: CPT

## 2019-01-09 PROCEDURE — 700111 HCHG RX REV CODE 636 W/ 250 OVERRIDE (IP): Performed by: INTERNAL MEDICINE

## 2019-01-09 PROCEDURE — 700102 HCHG RX REV CODE 250 W/ 637 OVERRIDE(OP): Performed by: INTERNAL MEDICINE

## 2019-01-09 PROCEDURE — 700105 HCHG RX REV CODE 258: Performed by: INTERNAL MEDICINE

## 2019-01-09 PROCEDURE — 700105 HCHG RX REV CODE 258

## 2019-01-09 RX ORDER — SODIUM CHLORIDE 9 MG/ML
INJECTION, SOLUTION INTRAVENOUS
Status: COMPLETED
Start: 2019-01-09 | End: 2019-01-09

## 2019-01-09 RX ADMIN — INSULIN GLARGINE 40 UNITS: 100 INJECTION, SOLUTION SUBCUTANEOUS at 19:58

## 2019-01-09 RX ADMIN — OXYCODONE HYDROCHLORIDE 5 MG: 5 TABLET ORAL at 21:00

## 2019-01-09 RX ADMIN — INSULIN LISPRO 5 UNITS: 100 INJECTION, SOLUTION INTRAVENOUS; SUBCUTANEOUS at 17:42

## 2019-01-09 RX ADMIN — LIDOCAINE HYDROCHLORIDE 10 ML: 10 INJECTION, SOLUTION INFILTRATION; PERINEURAL at 08:15

## 2019-01-09 RX ADMIN — VANCOMYCIN HYDROCHLORIDE 1500 MG: 100 INJECTION, POWDER, LYOPHILIZED, FOR SOLUTION INTRAVENOUS at 03:03

## 2019-01-09 RX ADMIN — ACETAMINOPHEN 650 MG: 325 TABLET, FILM COATED ORAL at 03:03

## 2019-01-09 RX ADMIN — SODIUM CHLORIDE 3 G: 900 INJECTION INTRAVENOUS at 00:28

## 2019-01-09 RX ADMIN — SODIUM CHLORIDE 3 G: 900 INJECTION INTRAVENOUS at 06:26

## 2019-01-09 RX ADMIN — VANCOMYCIN HYDROCHLORIDE 1500 MG: 100 INJECTION, POWDER, LYOPHILIZED, FOR SOLUTION INTRAVENOUS at 14:48

## 2019-01-09 RX ADMIN — MORPHINE SULFATE 2 MG: 4 INJECTION INTRAVENOUS at 18:38

## 2019-01-09 RX ADMIN — ATORVASTATIN CALCIUM 10 MG: 10 TABLET, FILM COATED ORAL at 19:55

## 2019-01-09 RX ADMIN — OXYCODONE HYDROCHLORIDE 5 MG: 5 TABLET ORAL at 03:49

## 2019-01-09 RX ADMIN — SODIUM CHLORIDE 3 G: 900 INJECTION INTRAVENOUS at 11:54

## 2019-01-09 RX ADMIN — SODIUM CHLORIDE 500 ML: 9 INJECTION, SOLUTION INTRAVENOUS at 00:29

## 2019-01-09 RX ADMIN — ACETAMINOPHEN 650 MG: 325 TABLET, FILM COATED ORAL at 21:14

## 2019-01-09 RX ADMIN — OXYCODONE HYDROCHLORIDE 5 MG: 5 TABLET ORAL at 23:59

## 2019-01-09 RX ADMIN — OXYCODONE HYDROCHLORIDE 5 MG: 5 TABLET ORAL at 17:44

## 2019-01-09 RX ADMIN — SODIUM CHLORIDE 3 G: 900 INJECTION INTRAVENOUS at 23:59

## 2019-01-09 RX ADMIN — LISINOPRIL 10 MG: 10 TABLET ORAL at 06:26

## 2019-01-09 RX ADMIN — OXYCODONE HYDROCHLORIDE 5 MG: 5 TABLET ORAL at 00:29

## 2019-01-09 RX ADMIN — OXYCODONE HYDROCHLORIDE 5 MG: 5 TABLET ORAL at 07:05

## 2019-01-09 RX ADMIN — SODIUM CHLORIDE 3 G: 900 INJECTION INTRAVENOUS at 17:22

## 2019-01-09 RX ADMIN — OXYCODONE HYDROCHLORIDE 5 MG: 5 TABLET ORAL at 14:45

## 2019-01-09 RX ADMIN — HEPARIN SODIUM 5000 UNITS: 5000 INJECTION, SOLUTION INTRAVENOUS; SUBCUTANEOUS at 20:02

## 2019-01-09 ASSESSMENT — COGNITIVE AND FUNCTIONAL STATUS - GENERAL
WALKING IN HOSPITAL ROOM: A LITTLE
DAILY ACTIVITIY SCORE: 21
MOBILITY SCORE: 17
SUGGESTED CMS G CODE MODIFIER MOBILITY: CK
MOBILITY SCORE: 16
SUGGESTED CMS G CODE MODIFIER DAILY ACTIVITY: CJ
SUGGESTED CMS G CODE MODIFIER MOBILITY: CK
STANDING UP FROM CHAIR USING ARMS: A LITTLE
MOVING FROM LYING ON BACK TO SITTING ON SIDE OF FLAT BED: A LITTLE
STANDING UP FROM CHAIR USING ARMS: A LITTLE
TURNING FROM BACK TO SIDE WHILE IN FLAT BAD: A LITTLE
MOVING TO AND FROM BED TO CHAIR: A LITTLE
MOVING FROM LYING ON BACK TO SITTING ON SIDE OF FLAT BED: A LITTLE
MOVING TO AND FROM BED TO CHAIR: A LITTLE
HELP NEEDED FOR BATHING: A LITTLE
WALKING IN HOSPITAL ROOM: A LOT
CLIMB 3 TO 5 STEPS WITH RAILING: A LOT
TOILETING: A LITTLE
DRESSING REGULAR LOWER BODY CLOTHING: A LITTLE
CLIMB 3 TO 5 STEPS WITH RAILING: A LOT
TURNING FROM BACK TO SIDE WHILE IN FLAT BAD: A LITTLE

## 2019-01-09 ASSESSMENT — ENCOUNTER SYMPTOMS
FOCAL WEAKNESS: 0
INSOMNIA: 0
EYE DISCHARGE: 0
PALPITATIONS: 0
STRIDOR: 0
EYE REDNESS: 0
BACK PAIN: 0
DIZZINESS: 0
MYALGIAS: 0
SHORTNESS OF BREATH: 0
HEARTBURN: 0
VOMITING: 0
SEIZURES: 0
COUGH: 0
HEADACHES: 0
NERVOUS/ANXIOUS: 0
DIARRHEA: 0
BLURRED VISION: 0
SPUTUM PRODUCTION: 0
NECK PAIN: 0
ABDOMINAL PAIN: 0
WEIGHT LOSS: 0
NAUSEA: 0
EYE PAIN: 0
DEPRESSION: 0
CHILLS: 0
FEVER: 0
ORTHOPNEA: 0

## 2019-01-09 ASSESSMENT — GAIT ASSESSMENTS
DISTANCE (FEET): 25
ASSISTIVE DEVICE: FRONT WHEEL WALKER
GAIT LEVEL OF ASSIST: CONTACT GUARD ASSIST
DEVIATION: ANTALGIC;STEP TO;DECREASED HEEL STRIKE;DECREASED TOE OFF;OTHER (COMMENT)

## 2019-01-09 ASSESSMENT — PATIENT HEALTH QUESTIONNAIRE - PHQ9
2. FEELING DOWN, DEPRESSED, IRRITABLE, OR HOPELESS: NOT AT ALL
SUM OF ALL RESPONSES TO PHQ9 QUESTIONS 1 AND 2: 0
1. LITTLE INTEREST OR PLEASURE IN DOING THINGS: NOT AT ALL

## 2019-01-09 ASSESSMENT — PAIN SCALES - GENERAL
PAINLEVEL_OUTOF10: 7
PAINLEVEL_OUTOF10: 5
PAINLEVEL_OUTOF10: 8
PAINLEVEL_OUTOF10: 6

## 2019-01-09 ASSESSMENT — LIFESTYLE VARIABLES: EVER_SMOKED: NEVER

## 2019-01-09 NOTE — PROGRESS NOTES
Hospital Medicine Daily Progress Note    Date of Service  1/9/2019    Chief Complaint  53 y.o. male admitted 1/8/2019 with right leg cellulitis    Hospital Course    52 y/o M with PMH of DMII, HTN, DLD admitted for above.      Interval Problem Update  Failed arthrocentesis multiple times by surgery today. Continue abx.    Consultants/Specialty  ortho    Code Status  full    Disposition  Remain on the floor    Review of Systems  Review of Systems   Constitutional: Negative for chills, fever and weight loss.   HENT: Negative for congestion and nosebleeds.    Eyes: Negative for blurred vision, pain, discharge and redness.   Respiratory: Negative for cough, sputum production, shortness of breath and stridor.    Cardiovascular: Negative for chest pain, palpitations and orthopnea.   Gastrointestinal: Negative for abdominal pain, diarrhea, heartburn, nausea and vomiting.   Genitourinary: Negative for dysuria, frequency and urgency.   Musculoskeletal: Negative for back pain, myalgias and neck pain.        Right leg pain   Skin: Negative for itching and rash.   Neurological: Negative for dizziness, focal weakness, seizures and headaches.   Psychiatric/Behavioral: Negative for depression. The patient is not nervous/anxious and does not have insomnia.         Physical Exam  Temp:  [36.6 °C (97.8 °F)-36.9 °C (98.5 °F)] 36.9 °C (98.5 °F)  Pulse:  [] 88  Resp:  [16-17] 16  BP: (148-202)/() 169/108    Physical Exam   Constitutional: He is oriented to person, place, and time. No distress.   HENT:   Head: Normocephalic and atraumatic.   Mouth/Throat: Oropharynx is clear and moist.   Eyes: Pupils are equal, round, and reactive to light. Conjunctivae and EOM are normal.   Neck: Normal range of motion. Neck supple. No tracheal deviation present. No thyromegaly present.   Cardiovascular: Normal rate and regular rhythm.    No murmur heard.  Pulmonary/Chest: Effort normal and breath sounds normal. No respiratory distress. He  has no wheezes.   Abdominal: Soft. Bowel sounds are normal. He exhibits no distension. There is no tenderness.   Musculoskeletal: He exhibits no edema or tenderness.   Right leg swelling, redness around knee joint area.   Neurological: He is alert and oriented to person, place, and time. No cranial nerve deficit.   Skin: Skin is warm and dry. He is not diaphoretic. No erythema.   Psychiatric: He has a normal mood and affect. His behavior is normal. Thought content normal.       Fluids  No intake or output data in the 24 hours ending 01/09/19 0759    Laboratory  Recent Labs      01/08/19   1415   WBC  13.1*   RBC  4.75   HEMOGLOBIN  15.2   HEMATOCRIT  42.6   MCV  89.7   MCH  32.0   MCHC  35.7*   RDW  40.3   PLATELETCT  246   MPV  11.2     Recent Labs      01/08/19   1415   SODIUM  139   POTASSIUM  3.8   CHLORIDE  103   CO2  24   GLUCOSE  203*   BUN  19   CREATININE  1.07   CALCIUM  9.5                   Imaging  US-EXTREMITY VENOUS LOWER UNILAT RIGHT   Final Result      DX-KNEE COMPLETE 4+ RIGHT   Final Result      1.  Mild degenerative change of RIGHT knee.   2.  Mild anterior soft tissue swelling.   3.  Probable small joint effusion.           Assessment/Plan  Cellulitis- (present on admission)   Assessment & Plan    RLE, Overlying knee with extension into anterior shin / marked   Significant knee pain / effusion concerning for septic arthritis   No DVT on ultrasound  Failed arthrocentesis  On IV abx with vanco and unasyn  Surgery on         Leucocytosis- (present on admission)   Assessment & Plan    Related to above     Other hyperlipidemia- (present on admission)   Assessment & Plan    Continue atorvastatin     Essential hypertension- (present on admission)   Assessment & Plan    Uncontrolled   Continue lisinopril, IV as needed enalapril  Titrate therapy to achieve normotensive control     Diabetes mellitus type II, uncontrolled (HCC)- (present on admission)   Assessment & Plan    Will decrease Lantus to 40  units  Mealtime insulin 5 units  Sliding scale insulin 3  Holding oral hypoglycemic agents  Titrate to achieve normal glycemic control          VTE prophylaxis: heparin

## 2019-01-09 NOTE — H&P
Hospital Medicine History & Physical Note    Date of Service  1/8/2019    Primary Care Physician  Mich Malhotra M.D.    Consultants  Orthopedics - Asked Dr Moralez from ER to consult orthopedics for evaluation of septic arthritis     Code Status  FULL CODE     Chief Complaint  Knee pain     History of Presenting Illness  53 y.o. male who presented 1/8/2019 with Knee pain.     Patient with history of type 2 diabetes mellitus uncontrolled, hypertension and dyslipidemia.    Patient presents to the hospital with intractable right lower extremity pain.  Patient reports that this developed on Friday.  Pain localized to the right knee area, stabbing/sharp in character, 10 out of 10 in intensity, nonradiating, nothing makes it better, worsens with ambulation, associated erythema, warmth and swelling of the right lower extremity.  No associated fever or chills.  Nausea with above symptoms.  No chest pain or shortness of breath.  No other complaints at this time.  Underlying known history of diabetes mellitus. Significant knee swelling R sided.     Review of Systems  Review of Systems   Constitutional: Positive for malaise/fatigue. Negative for chills, diaphoresis, fever and weight loss.   HENT: Negative for congestion, ear discharge, ear pain, hearing loss, nosebleeds, sinus pain, sore throat and tinnitus.    Eyes: Negative for blurred vision, double vision, pain, discharge and redness.   Respiratory: Negative for cough, hemoptysis, sputum production, shortness of breath, wheezing and stridor.    Cardiovascular: Positive for leg swelling. Negative for chest pain, palpitations, orthopnea, claudication and PND.   Gastrointestinal: Positive for nausea. Negative for abdominal pain, blood in stool, constipation, diarrhea, heartburn, melena and vomiting.   Genitourinary: Negative for dysuria, flank pain, frequency, hematuria and urgency.   Musculoskeletal: Positive for joint pain and myalgias. Negative for back pain, falls and  neck pain.   Skin: Positive for rash. Negative for itching.   Neurological: Positive for weakness. Negative for dizziness, tingling, tremors, sensory change, speech change, focal weakness, seizures, loss of consciousness and headaches.   Psychiatric/Behavioral: Negative for depression, hallucinations, memory loss, substance abuse and suicidal ideas. The patient is not nervous/anxious and does not have insomnia.        Past Medical History   has a past medical history of Diabetes (HCC) and Hypertension.    Surgical History  Negative per patient     Family History  DM in family per patient   CAD father     Social History   reports that he has never smoked. He has never used smokeless tobacco. He reports that he drinks alcohol. He reports that he does not use drugs.    Allergies  No Known Allergies    Medications  Prior to Admission Medications   Prescriptions Last Dose Informant Patient Reported? Taking?   atorvastatin (LIPITOR) 10 MG Tab 1/7/2019 at 1930 Patient Yes No   Sig: Take 10 mg by mouth every evening.   insulin aspart (NOVOLOG) 100 UNIT/ML Solution 1/8/2019 at 0900 Patient Yes No   Sig: Inject 16 Units as instructed 3 times a day before meals.   insulin glargine (LANTUS) 100 UNIT/ML Solution 1/7/2019 at 2200 Patient Yes No   Sig: Inject 55 Units as instructed every evening.   lisinopril (PRINIVIL) 10 MG Tab 1/8/2019 at 0900 Patient Yes No   Sig: Take 10 mg by mouth every day.   metformin (GLUCOPHAGE) 1000 MG tablet 1/7/2019 at 1230 Patient Yes Yes   Sig: Take 1,000 mg by mouth every day with lunch.   raNITidine (ZANTAC) 150 MG Tab 1/8/2019 at 0900 Patient Yes No   Sig: Take 150 mg by mouth every day.      Facility-Administered Medications: None       Physical Exam  Temp:  [36.6 °C (97.8 °F)] 36.6 °C (97.8 °F)  Pulse:  [88-95] 88  Resp:  [16-17] 16  BP: (164)/(106) 164/106    Physical Exam   Constitutional: He is oriented to person, place, and time. He appears well-developed and well-nourished. He appears  distressed.   Body mass index is 32.28 kg/m².   HENT:   Head: Normocephalic.   Mouth/Throat: Oropharynx is clear and moist. No oropharyngeal exudate.   Eyes: Pupils are equal, round, and reactive to light. Conjunctivae and EOM are normal. No scleral icterus.   Neck: Normal range of motion. No JVD present. No thyromegaly present.   Cardiovascular: Normal rate, regular rhythm and normal heart sounds.    No murmur heard.  Pulses:       Posterior tibial pulses are 2+ on the right side, and 2+ on the left side.   Cap refill < 3s   Pulmonary/Chest: No stridor. No respiratory distress. He has no wheezes.   Diminished in bases   Abdominal: Soft. Bowel sounds are normal. He exhibits no distension. There is no tenderness. There is no rebound.   Musculoskeletal: He exhibits edema and tenderness. He exhibits no deformity.   Lymphadenopathy:     He has no cervical adenopathy.   Neurological: He is alert and oriented to person, place, and time. He has normal reflexes. No cranial nerve deficit.   Skin: Skin is warm and dry. Rash (RLE) noted. He is not diaphoretic.   RLE cellulitis / marked. Involving R knee and inferior to R knee. Significant R knee swelling with underlying effusion and significant tenderness to palpation of R knee and patella with significant decreased ROM of R knee 2/2 underlying pain. Unable to complete detailed evaluation of R knee 2/2 intractable pain at this time.    Psychiatric: He has a normal mood and affect. His behavior is normal. Judgment and thought content normal.       Laboratory:  Recent Labs      01/08/19   1415   WBC  13.1*   RBC  4.75   HEMOGLOBIN  15.2   HEMATOCRIT  42.6   MCV  89.7   MCH  32.0   MCHC  35.7*   RDW  40.3   PLATELETCT  246   MPV  11.2     Recent Labs      01/08/19   1415   SODIUM  139   POTASSIUM  3.8   CHLORIDE  103   CO2  24   GLUCOSE  203*   BUN  19   CREATININE  1.07   CALCIUM  9.5     Recent Labs      01/08/19   1415   ALTSGPT  21   ASTSGOT  11*   ALKPHOSPHAT  76    TBILIRUBIN  0.9   GLUCOSE  203*                 No results for input(s): TROPONINI in the last 72 hours.    Urinalysis:    No results found     Imaging:  US-EXTREMITY VENOUS LOWER UNILAT RIGHT    (Results Pending)   DX-KNEE 3 VIEWS RIGHT    (Results Pending)         Assessment/Plan:  I anticipate this patient will require at least two midnights for appropriate medical management, necessitating inpatient admission.    Cellulitis- (present on admission)   Assessment & Plan    RLE, Overlying knee with extension into anterior shin / marked   Significant knee pain / effusion concerning for septic arthritis   DVT cannot be ruled out     Tap not recommended given overlying cellulitis     Obtain Knee XRAY   Obtain DVT duplex    Start IV vancomycin, Unasyn - de escalate as able  Orthopedics consultation, advised Dr Moralez from ER to consult orthopedics for evaluation of septic arthritis   Consider ID consultation in am tomorrow  Pain control including use of narcotics / IV morphine   PT evaluation   For now tentatively NPO after midnight   Check ESR / CRP      Other hyperlipidemia- (present on admission)   Assessment & Plan    Continue atorvastatin     Hypertension- (present on admission)   Assessment & Plan    Uncontrolled   Continue lisinopril, IV as needed enalapril  Titrate therapy to achieve normotensive control     Diabetes mellitus type II, uncontrolled (HCC)- (present on admission)   Assessment & Plan    Will decrease Lantus to 40 units  Mealtime insulin 5 units  Sliding scale insulin 3  Holding oral hypoglycemic agents  Titrate to achieve normal glycemic control         VTE prophylaxis: SC Heparin

## 2019-01-09 NOTE — PROGRESS NOTES
· 2 RN skin check complete.   · Devices in place: none.  · Skin assessed: behind ears, elbows, coccyx, heels, trunk  · Excoriation and scabbing to Right shoulder, Left posterior ribs, Left lateral thigh.    · A golf ball sized soft tissue lump to lateral side of left thigh, per pt it has been there for years and denies pain to site.    · Erythema and swelling to RLE, diagnosed with cellulitis and septic knee joint, outlined in red ink  · No pressure ulcers found.

## 2019-01-09 NOTE — PROGRESS NOTES
Orthopaedic PA Progress Note    Patient seen per request of Dr. Alberts for joint aspiration of right knee joint.   Diagnostic imaging revealed Mild soft tissue swelling anteriorly over the patella and infrapatellar regions.Probable small joint effusion. Joint spaces are preserved.  Mild osteophyte formation at the articular margins.No fracture or dislocation..     Physical examination was positive for mild patellar apprehension, swelling, tenderness and pain; and negative for heat, erythema, or a ballotable patella..    The indications, risks, benefits, and alternatives of joint aspiration were presented to the patient.  Understanding this, the patient wished to proceed.   The RIGHT KNEE was prepped with alcohol.   A 22 gauge needle was swiftly introduced into the synovial space utilizing aseptic technique and 0.5 - 1 mL of bloody aspirate was obtained. A dry sterile dressing was placed utilizing bandaids, then covered snugly with an ace wrap. The distal extremity remained NVTI throughout and after the procedure. There was minimal blood loss, no complications, patient tolerated the procedure well .   Total procedure time, including obtaining verbal consent, patient education, preparation/draping, and dressing took about 20 minutes.

## 2019-01-09 NOTE — ED NOTES
Pt complaining of pain in knee otherwise resting comfortably. accucheck completed prior to food. NPO after midnight.

## 2019-01-09 NOTE — ASSESSMENT & PLAN NOTE
RLE  No DVT on ultrasound  Culture: NGTD on knee aspirate  On IV abx with unasyn  Surgery on  improving

## 2019-01-09 NOTE — PROGRESS NOTES
"Pharmacy Vancomycin Kinetics     Epi Cortés is a 53 y.o. male on vancomycin day # 1 for an indication of septic joint and cellulitis.    Current dosing:  · Vancomycin 2200 mg IV loading dose on   · Vancomycin 1500 mg IV every 12 hours    Vancomycin history:  · None found in EPIC    History of Present Illness:   Admitted on 2019 for septic joint and cellulitis.  Pertinent past medical history per chart.    Antimicrobial history for admission:   · Ampicillin/sulbactam  - current  · Vancomycin  - current    Allergies: Patient has no known allergies.     Concerns for renal function: obesity, diabetes, concomitant nephrotoxic medications (lisinopril)    Pertinent cultures to date:   · Blood culture, pending    Recent Labs      19   1415   WBC  13.1*   NEUTSPOLYS  81.00*     Recent Labs      19   1415   BUN  19   CREATININE  1.07   ALBUMIN  3.9     No results for input(s): VANCOTROUGH, VANCOPEAK, VANCORANDOM in the last 72 hours.No intake or output data in the 24 hours ending 19 2210   Blood pressure (!) 164/106, pulse 91, temperature 36.6 °C (97.8 °F), temperature source Temporal, resp. rate 16, height 1.651 m (5' 5\"), weight 88 kg (194 lb 0.1 oz), SpO2 100 %. Temp (24hrs), Av.6 °C (97.8 °F), Min:36.6 °C (97.8 °F), Max:36.6 °C (97.8 °F)      Assessment and Plan:  1. Dosing regimen and changes: Vancomycin initiated in the ED with concern for septic joint and cellulitis  2. Next vancomycin level: Per inpatient pharmacist pending clinical plan  3. Goal trough: 12-16 mcg/mL for indication of septic joint and cellulitis  4. Comments and plan: Vancomycin initiated for septic joint and cellulitis.  Recommend de-escalation as appropriate based upon clinical picture and culture data as it becomes available.  Ortho and ID to be consulted per H&P.      Thank you!  Nori Zurita, PharmD, BCCCP    "

## 2019-01-09 NOTE — THERAPY
PT order received; Pt is currently awaiting for aspiration intervention this morning; will re-attempt as able.

## 2019-01-09 NOTE — ASSESSMENT & PLAN NOTE
Will decrease Lantus to 40 units  Mealtime insulin 5 units  Sliding scale insulin 3  Holding oral hypoglycemic agents  Titrate to achieve normal glycemic control

## 2019-01-09 NOTE — CARE PLAN
Problem: Safety  Goal: Will remain free from injury  Outcome: PROGRESSING AS EXPECTED      Problem: Pain Management  Goal: Pain level will decrease to patient's comfort goal  Outcome: PROGRESSING AS EXPECTED  Pain controlled with current management

## 2019-01-09 NOTE — THERAPY
"Physical Therapy Evaluation completed.   Bed Mobility:  Supine to Sit: Minimal Assist (assist with RLE )  Transfers: Sit to Stand: Contact Guard Assist  Gait: Level Of Assist: Contact Guard Assist with Front-Wheel Walker       Plan of Care: Will benefit from Physical Therapy 4 times per week  Discharge Recommendations: Equipment: Front-Wheel Walker and Will Continue to Assess for Equipment Needs. Recommend outpatient or home health transitional care services for continued physical therapy services.    See \"Rehab Therapy-Acute\" Patient Summary Report for complete documentation.     Pt was recenlty admitted for R knee pain with possible septic arthritis and cellulitis. Pt is now s/p aspiration of RLE and has WBAT precautions in place. Pt was primarily limited due to pain in RLE and demonstrated with poor WB tolerance during ambulation and relies on FWW. Pt was only able to ambulate about 25ft. Pt reports pain has improved as he is able to perfrom a single leg raise, however, continues to have pain with WB activites. Pt was edcuated on icing, elevation, and positiong to assist in decreasing inflammation and pain managment. Pt will continue to benefit from skilled PT while in house, anticipate pt to d/c home with family support and outpatien thearpy services for optimal progression. Will continue to follow.   "

## 2019-01-10 LAB
GLUCOSE BLD-MCNC: 127 MG/DL (ref 65–99)
GLUCOSE BLD-MCNC: 153 MG/DL (ref 65–99)
GLUCOSE BLD-MCNC: 195 MG/DL (ref 65–99)
GLUCOSE BLD-MCNC: 226 MG/DL (ref 65–99)
VANCOMYCIN TROUGH SERPL-MCNC: 8.2 UG/ML (ref 10–20)

## 2019-01-10 PROCEDURE — 700102 HCHG RX REV CODE 250 W/ 637 OVERRIDE(OP): Performed by: INTERNAL MEDICINE

## 2019-01-10 PROCEDURE — A9270 NON-COVERED ITEM OR SERVICE: HCPCS | Performed by: INTERNAL MEDICINE

## 2019-01-10 PROCEDURE — 770006 HCHG ROOM/CARE - MED/SURG/GYN SEMI*

## 2019-01-10 PROCEDURE — 700111 HCHG RX REV CODE 636 W/ 250 OVERRIDE (IP): Performed by: INTERNAL MEDICINE

## 2019-01-10 PROCEDURE — 36415 COLL VENOUS BLD VENIPUNCTURE: CPT

## 2019-01-10 PROCEDURE — 82962 GLUCOSE BLOOD TEST: CPT | Mod: 91

## 2019-01-10 PROCEDURE — 99233 SBSQ HOSP IP/OBS HIGH 50: CPT | Performed by: INTERNAL MEDICINE

## 2019-01-10 PROCEDURE — 700105 HCHG RX REV CODE 258

## 2019-01-10 PROCEDURE — 700105 HCHG RX REV CODE 258: Performed by: INTERNAL MEDICINE

## 2019-01-10 PROCEDURE — 80202 ASSAY OF VANCOMYCIN: CPT

## 2019-01-10 PROCEDURE — 700112 HCHG RX REV CODE 229: Performed by: INTERNAL MEDICINE

## 2019-01-10 RX ORDER — POLYETHYLENE GLYCOL 3350 17 G/17G
1 POWDER, FOR SOLUTION ORAL DAILY
Status: DISCONTINUED | OUTPATIENT
Start: 2019-01-10 | End: 2019-01-12 | Stop reason: HOSPADM

## 2019-01-10 RX ORDER — SODIUM CHLORIDE 9 MG/ML
INJECTION, SOLUTION INTRAVENOUS
Status: COMPLETED
Start: 2019-01-10 | End: 2019-01-10

## 2019-01-10 RX ORDER — DOCUSATE SODIUM 100 MG/1
100 CAPSULE, LIQUID FILLED ORAL 2 TIMES DAILY
Status: DISCONTINUED | OUTPATIENT
Start: 2019-01-10 | End: 2019-01-12 | Stop reason: HOSPADM

## 2019-01-10 RX ADMIN — VANCOMYCIN HYDROCHLORIDE 1500 MG: 100 INJECTION, POWDER, LYOPHILIZED, FOR SOLUTION INTRAVENOUS at 15:50

## 2019-01-10 RX ADMIN — VANCOMYCIN HYDROCHLORIDE 1500 MG: 100 INJECTION, POWDER, LYOPHILIZED, FOR SOLUTION INTRAVENOUS at 22:34

## 2019-01-10 RX ADMIN — MORPHINE SULFATE 2 MG: 4 INJECTION INTRAVENOUS at 21:27

## 2019-01-10 RX ADMIN — INSULIN LISPRO 5 UNITS: 100 INJECTION, SOLUTION INTRAVENOUS; SUBCUTANEOUS at 11:18

## 2019-01-10 RX ADMIN — VANCOMYCIN HYDROCHLORIDE 1500 MG: 100 INJECTION, POWDER, LYOPHILIZED, FOR SOLUTION INTRAVENOUS at 02:48

## 2019-01-10 RX ADMIN — OXYCODONE HYDROCHLORIDE 5 MG: 5 TABLET ORAL at 11:22

## 2019-01-10 RX ADMIN — SODIUM CHLORIDE 3 G: 900 INJECTION INTRAVENOUS at 11:22

## 2019-01-10 RX ADMIN — INSULIN LISPRO 5 UNITS: 100 INJECTION, SOLUTION INTRAVENOUS; SUBCUTANEOUS at 17:50

## 2019-01-10 RX ADMIN — OXYCODONE HYDROCHLORIDE 5 MG: 5 TABLET ORAL at 22:50

## 2019-01-10 RX ADMIN — OXYCODONE HYDROCHLORIDE 5 MG: 5 TABLET ORAL at 18:32

## 2019-01-10 RX ADMIN — SODIUM CHLORIDE 500 ML: 9 INJECTION, SOLUTION INTRAVENOUS at 11:23

## 2019-01-10 RX ADMIN — MORPHINE SULFATE 2 MG: 4 INJECTION INTRAVENOUS at 08:26

## 2019-01-10 RX ADMIN — ATORVASTATIN CALCIUM 10 MG: 10 TABLET, FILM COATED ORAL at 21:28

## 2019-01-10 RX ADMIN — POLYETHYLENE GLYCOL 3350 1 PACKET: 17 POWDER, FOR SOLUTION ORAL at 17:54

## 2019-01-10 RX ADMIN — SODIUM CHLORIDE 3 G: 900 INJECTION INTRAVENOUS at 04:56

## 2019-01-10 RX ADMIN — MORPHINE SULFATE 2 MG: 4 INJECTION INTRAVENOUS at 16:52

## 2019-01-10 RX ADMIN — HEPARIN SODIUM 5000 UNITS: 5000 INJECTION, SOLUTION INTRAVENOUS; SUBCUTANEOUS at 21:27

## 2019-01-10 RX ADMIN — INSULIN GLARGINE 40 UNITS: 100 INJECTION, SOLUTION SUBCUTANEOUS at 22:25

## 2019-01-10 RX ADMIN — HEPARIN SODIUM 5000 UNITS: 5000 INJECTION, SOLUTION INTRAVENOUS; SUBCUTANEOUS at 13:54

## 2019-01-10 RX ADMIN — OXYCODONE HYDROCHLORIDE 5 MG: 5 TABLET ORAL at 13:53

## 2019-01-10 RX ADMIN — OXYCODONE HYDROCHLORIDE 5 MG: 5 TABLET ORAL at 05:04

## 2019-01-10 RX ADMIN — ENALAPRILAT 1.25 MG: 2.5 INJECTION INTRAVENOUS at 21:27

## 2019-01-10 RX ADMIN — LISINOPRIL 10 MG: 10 TABLET ORAL at 04:56

## 2019-01-10 RX ADMIN — DOCUSATE SODIUM 100 MG: 100 CAPSULE, LIQUID FILLED ORAL at 17:50

## 2019-01-10 RX ADMIN — SODIUM CHLORIDE 3 G: 900 INJECTION INTRAVENOUS at 18:32

## 2019-01-10 ASSESSMENT — ENCOUNTER SYMPTOMS
EYE DISCHARGE: 0
NECK PAIN: 0
SPUTUM PRODUCTION: 0
SHORTNESS OF BREATH: 0
DEPRESSION: 0
SEIZURES: 0
VOMITING: 0
HEADACHES: 0
ORTHOPNEA: 0
WEIGHT LOSS: 0
INSOMNIA: 0
FOCAL WEAKNESS: 0
BACK PAIN: 0
HEARTBURN: 0
NERVOUS/ANXIOUS: 0
COUGH: 0
CHILLS: 0
FEVER: 0
BLURRED VISION: 0
NAUSEA: 0
EYE PAIN: 0
DIZZINESS: 0
STRIDOR: 0
MYALGIAS: 0

## 2019-01-10 ASSESSMENT — PAIN SCALES - GENERAL
PAINLEVEL_OUTOF10: 7
PAINLEVEL_OUTOF10: 6
PAINLEVEL_OUTOF10: 5
PAINLEVEL_OUTOF10: 4
PAINLEVEL_OUTOF10: 7
PAINLEVEL_OUTOF10: 4
PAINLEVEL_OUTOF10: 5
PAINLEVEL_OUTOF10: 9
PAINLEVEL_OUTOF10: 7
PAINLEVEL_OUTOF10: 10
PAINLEVEL_OUTOF10: 4

## 2019-01-10 NOTE — PROGRESS NOTES
Pt is AAOx4  Pt reports a 8/10 pain level  Medicated per MAR  VS WNL  POC discussed  All needs met at this time  Bed in low position  Call light within reach  Rounding in place

## 2019-01-10 NOTE — PROGRESS NOTES
"Pharmacy Kinetics 53 y.o. male on vancomycin day # 2 2019    Currently on Vancomycin 1500 mg IV q12h    Indication for Treatment: Empiric RLE cellulitis and possible septic arthritis    Pertinent history per medical record: Admitted on 2019 for R knee pain associated with swelling and erythema. Symptoms started on . PMH significant for DM. Ortho has been consulted and performed a joint aspiration on .    Other antibiotics: Unasyn    Allergies: Patient has no known allergies.     Concerns for renal function: BMI ~32, DM    Pertinent cultures to date:    Blood x2 (peripheral): NGTD   R knee aspirate: in process    Recent Labs      19   1415   WBC  13.1*   NEUTSPOLYS  81.00*     Recent Labs      19   1415   BUN  19   CREATININE  1.07   ALBUMIN  3.9     Blood pressure 146/76, pulse 69, temperature 36.3 °C (97.3 °F), temperature source Temporal, resp. rate 16, height 1.651 m (5' 5\"), weight 88 kg (194 lb 0.1 oz), SpO2 98 %. Temp (24hrs), Av.6 °C (97.9 °F), Min:36.3 °C (97.3 °F), Max:36.9 °C (98.5 °F)      A/P   1. Vancomycin dose change: continue 1500 mg IV q12h (@ 0300 & 1500)  2. Next vancomycin level: Tomorrow at 0230, before the 4th dose  3. Goal trough: 12-16 mcg/mL  4. Comments: No new labs today. Pt remains afebrile. All doses have been hung appropriately per charting. Will check a trough tomorrow as the level approaches steady state.    Paulette Olvera, PharmD, BCPS  "

## 2019-01-10 NOTE — PROGRESS NOTES
[facility] Medicine Daily Progress Note    Date of Service  1/10/2019    Chief Complaint  53 y.o. male admitted 1/8/2019 with right leg cellulitis    Hospital Course    54 y/o M with PMH of DMII, HTN, DLD admitted for above.      Interval Problem Update  Still having pain around right knee area.     Consultants/Specialty  ortho    Code Status  full    Disposition  Remain on the floor    Review of Systems  Review of Systems   Constitutional: Negative for chills, fever, malaise/fatigue and weight loss.   HENT: Negative for congestion and nosebleeds.    Eyes: Negative for blurred vision, pain and discharge.   Respiratory: Negative for cough, sputum production, shortness of breath and stridor.    Cardiovascular: Negative for chest pain and orthopnea.   Gastrointestinal: Negative for heartburn, nausea and vomiting.   Genitourinary: Negative for dysuria and frequency.   Musculoskeletal: Positive for joint pain. Negative for back pain, myalgias and neck pain.        Right leg pain   Skin: Negative for itching and rash.   Neurological: Negative for dizziness, focal weakness, seizures and headaches.   Psychiatric/Behavioral: Negative for depression. The patient is not nervous/anxious and does not have insomnia.         Physical Exam  Temp:  [36.3 °C (97.3 °F)-38.9 °C (102.1 °F)] 37 °C (98.6 °F)  Pulse:  [] 69  Resp:  [16] 16  BP: (130-164)/(71-98) 140/78    Physical Exam   Constitutional: He is oriented to person, place, and time. No distress.   HENT:   Head: Normocephalic and atraumatic.   Eyes: Pupils are equal, round, and reactive to light. EOM are normal.   Neck: Normal range of motion. No tracheal deviation present. No thyromegaly present.   Cardiovascular: Normal rate and regular rhythm.    No murmur heard.  Pulmonary/Chest: Effort normal. No respiratory distress. He has no wheezes.   Abdominal: Soft. He exhibits no distension. There is no tenderness.   Musculoskeletal: He exhibits no edema or tenderness.   Right  leg swelling, redness around knee joint area.   Neurological: He is alert and oriented to person, place, and time. No cranial nerve deficit.   Skin: Skin is warm and dry. He is not diaphoretic. No erythema.   Psychiatric: He has a normal mood and affect. His behavior is normal. Thought content normal.       Fluids  No intake or output data in the 24 hours ending 01/10/19 0753    Laboratory  Recent Labs      01/08/19   1415   WBC  13.1*   RBC  4.75   HEMOGLOBIN  15.2   HEMATOCRIT  42.6   MCV  89.7   MCH  32.0   MCHC  35.7*   RDW  40.3   PLATELETCT  246   MPV  11.2     Recent Labs      01/08/19   1415   SODIUM  139   POTASSIUM  3.8   CHLORIDE  103   CO2  24   GLUCOSE  203*   BUN  19   CREATININE  1.07   CALCIUM  9.5                   Imaging  US-EXTREMITY VENOUS LOWER UNILAT RIGHT   Final Result      DX-KNEE COMPLETE 4+ RIGHT   Final Result      1.  Mild degenerative change of RIGHT knee.   2.  Mild anterior soft tissue swelling.   3.  Probable small joint effusion.           Assessment/Plan  Cellulitis- (present on admission)   Assessment & Plan    RLE  No DVT on ultrasound  Culture: NGTD on knee aspirate  On IV abx with vanco and unasyn  Surgery on         Leucocytosis- (present on admission)   Assessment & Plan    Related to above     Other hyperlipidemia- (present on admission)   Assessment & Plan    Continue atorvastatin     Essential hypertension- (present on admission)   Assessment & Plan    Uncontrolled   Continue lisinopril, IV as needed enalapril  Titrate therapy to achieve normotensive control     Diabetes mellitus type II, uncontrolled (HCC)- (present on admission)   Assessment & Plan    Will decrease Lantus to 40 units  Mealtime insulin 5 units  Sliding scale insulin 3  Holding oral hypoglycemic agents  Titrate to achieve normal glycemic control          VTE prophylaxis: heparin

## 2019-01-10 NOTE — PROGRESS NOTES
"   Orthopaedic PA Progress Note    Interval changes:Knee feels better. Gram stain - no organisms    ROS - Patient denies any new issues. No chest pain, dyspnea, or fever.  Pain well controlled.    Blood pressure (!) 171/88, pulse 84, temperature 37.4 °C (99.4 °F), temperature source Oral, resp. rate 18, height 1.651 m (5' 5\"), weight 88 kg (194 lb 0.1 oz), SpO2 93 %.    Patient seen and examined  No acute distress  Breathing non labored  RRR  Surgical dressing is clean, dry, and intact. Patient clearly fires tibialis anterior, EHL, and gastrocnemius/soleus. Sensation is intact to light touch throughout superficial peroneal, deep peroneal, tibial, saphenous, and sural nerve distributions. Strong and palpable 2+ dorsalis pedis and posterior tibial pulses with capillary refill less than 2 seconds. No lower leg tenderness or discomfort.    Recent Labs      01/08/19   1415   WBC  13.1*   RBC  4.75   HEMOGLOBIN  15.2   HEMATOCRIT  42.6   MCV  89.7   MCH  32.0   MCHC  35.7*   RDW  40.3   PLATELETCT  246   MPV  11.2       Active Hospital Problems    Diagnosis   • Cellulitis [L03.90]     Priority: High   • Diabetes mellitus type II, uncontrolled (HCC) [E11.65]     Priority: Low   • Other hyperlipidemia [E78.49]     Priority: Low   • Essential hypertension [I10]     Priority: Low   • Leucocytosis [D72.829]       Assessment/Plan:  Right knee aspiration yesterday - does not appear to be septic, aylin follow cultures. OT/PT diet all OK  Wt bearing status - AT  PT/OT-initiated  Case Coordination for Discharge Planning - Disposition per Medicine      "

## 2019-01-10 NOTE — CONSULTS
DATE OF SERVICE:  01/09/2019    REQUESTING PHYSICIAN:  Di Cordon MD    CHIEF COMPLAINT:  Right knee pain.    HISTORY:  The patient is a 53-year-old diabetic man who has had pain in his   right knee that developed about 3-4 days ago.  He describes it as sharp and   stabbing, radiated to 10 on a scale of 1-10, presented to the emergency room   last night, felt to have cellulitis, admitted by the hospitalist, orthopedic   consultation was requested.    ALLERGIES:  None.    MEDICATIONS:  Atorvastatin, NovoLog insulin, Lantus insulin, lisinopril,   metformin, ranitidine.    PAST MEDICAL HISTORY:  Diabetes mellitus, hypertension.    PAST SURGICAL HISTORY:  None.    SOCIAL HISTORY:  The patient does not smoke.  He does drink alcohol, does not   use any drugs.    FAMILY HISTORY:  Positive for heart disease in father and diabetes in other   family members.    REVIEW OF SYSTEMS:  No loss of conscious.  Patient has had some nausea.  No   vomiting, diarrhea, constipation, polyuria, dysuria, fevers, chills, weight   loss, weight gain, abdominal pain, chest pain or shortness of breath.  He does   complain of weakness, malaise, fatigue, myalgias.    PHYSICAL EXAMINATION:  GENERAL:  The patient is in no acute distress, lying in his bed.  VITAL SIGNS:  His blood pressure is 169/108, heart rate 88, respirations 16,   temperature 98.5.  HEENT:  Normocephalic, atraumatic.  NECK:  Supple, nontender.  CHEST AND ABDOMEN:  Nontender.  No labored breathing.  EXTREMITIES:  The left lower and bilateral upper extremities without   tenderness or deformity.  Right lower extremity shows mild swelling around the   knee and proximal leg.  He is tender medial and lateral to the patella.  No   tenderness over the anterior aspect of the patella.  He does have pain with   flexion of the knee.    LABORATORY DATA:  Include white blood cell count of 13,100, hematocrit 42.6%,   platelet count 246,000, sed rate is 82.  Sodium 139, potassium 3.8,  creatinine   1.07.  AST and ALT are normal.  Albumin is 3.9.    CRP is elevated at 24.99.    Blood cultures pending.    Radiographs of the right knee show mild degenerative changes.  There is mild   soft tissue swelling anteriorly.  No obvious joint effusion.    ASSESSMENT:  Right knee pain and cellulitis.    PLAN:  Given the patient's limited range of motion as well as swelling around   the knee, I recommended aspiration of the knee, we will do that later this   morning.  He has already been started on antibiotics for cellulitis.  If the   aspirate is negative, I would suggest 48-72 hours of Toradol as well to help   with the inflammation.       ____________________________________     MD CLAIRE SOTO / TI    DD:  01/10/2019 10:30:14  DT:  01/10/2019 10:43:22    D#:  [id number]  Job#:  [id number]

## 2019-01-10 NOTE — CARE PLAN
Problem: Communication  Goal: The ability to communicate needs accurately and effectively will improve  Pt updated on NPO status at midnight.  Pt reports understanding    Problem: Pain Management  Goal: Pain level will decrease to patient's comfort goal  PRN oxy and morphine given.  Pt updated on when pain meds are due.  Leg elevated on pillow, ice applied. Pt resting comfortably in bed    Problem: Mobility  Goal: Risk for activity intolerance will decrease  Pt refusing to mobilize due to pain.  Education provided.  Mobilization encouraged

## 2019-01-10 NOTE — PROGRESS NOTES
"Pharmacy Kinetics 53 y.o. male on vancomycin day # 3 1/10/2019    Currently on Vancomycin 1500 mg IV q12h     Indication for Treatment: Empiric RLE cellulitis and possible septic arthritis     Pertinent history per medical record: Admitted on 2019 for R knee pain associated with swelling and erythema. Symptoms started on . PMH significant for DM. Ortho has been consulted and performed a joint aspiration on .     Other antibiotics: Unasyn     Allergies: Patient has no known allergies.      Concerns for renal function: BMI ~32, DM     Pertinent cultures to date:    Blood x2 (peripheral): NGTD   R knee aspirate: gram stain - NGTD    Recent Labs      19   1415   WBC  13.1*   NEUTSPOLYS  81.00*     Recent Labs      19   1415   BUN  19   CREATININE  1.07   ALBUMIN  3.9     Recent Labs      01/10/19   0230   VANCOTROUGH  8.2*     Intake/Output Summary (Last 24 hours) at 01/10/19 0952  Last data filed at 01/10/19 0800   Gross per 24 hour   Intake                0 ml   Output              700 ml   Net             -700 ml      Blood pressure 140/78, pulse 69, temperature 37 °C (98.6 °F), temperature source Temporal, resp. rate 16, height 1.651 m (5' 5\"), weight 88 kg (194 lb 0.1 oz), SpO2 98 %. Temp (24hrs), Av.6 °C (99.6 °F), Min:36.7 °C (98.1 °F), Max:38.9 °C (102.1 °F)      A/P   1. Vancomycin dose change: yes, increase dose to 1500mg q8hr (1500, 2300, 0700)  2. Next vancomycin level: ( at 0600; not ordered)  3. Goal trough: 12-16 mcg/mL  4. Comments: Culture of knee aspiration has NGTD.  Patient did have reported fever of 102.1 overnight, alleviated with two doses of acetaminophen.  No new labs to assess renal function, however UOP and number of voiding's per charting appears appropriate.  Concerns for accumulation due to BMI and uncontrolled DM remain.  Will increase dosing frequency to q8hr to mitigate subtherapeutic trough drawn prior to 4th dose.  Continue to monitor culture " results.    Franko Dunn, VasileD, BCPS

## 2019-01-11 ENCOUNTER — PATIENT OUTREACH (OUTPATIENT)
Dept: HEALTH INFORMATION MANAGEMENT | Facility: OTHER | Age: 54
End: 2019-01-11

## 2019-01-11 LAB
GLUCOSE BLD-MCNC: 111 MG/DL (ref 65–99)
GLUCOSE BLD-MCNC: 176 MG/DL (ref 65–99)
GLUCOSE BLD-MCNC: 201 MG/DL (ref 65–99)
GLUCOSE BLD-MCNC: 203 MG/DL (ref 65–99)
GLUCOSE BLD-MCNC: 220 MG/DL (ref 65–99)
GLUCOSE BLD-MCNC: 260 MG/DL (ref 65–99)

## 2019-01-11 PROCEDURE — 700102 HCHG RX REV CODE 250 W/ 637 OVERRIDE(OP): Performed by: INTERNAL MEDICINE

## 2019-01-11 PROCEDURE — 700105 HCHG RX REV CODE 258: Performed by: INTERNAL MEDICINE

## 2019-01-11 PROCEDURE — 700111 HCHG RX REV CODE 636 W/ 250 OVERRIDE (IP): Performed by: INTERNAL MEDICINE

## 2019-01-11 PROCEDURE — 97116 GAIT TRAINING THERAPY: CPT

## 2019-01-11 PROCEDURE — A9270 NON-COVERED ITEM OR SERVICE: HCPCS | Performed by: INTERNAL MEDICINE

## 2019-01-11 PROCEDURE — 700112 HCHG RX REV CODE 229: Performed by: INTERNAL MEDICINE

## 2019-01-11 PROCEDURE — 99233 SBSQ HOSP IP/OBS HIGH 50: CPT | Performed by: INTERNAL MEDICINE

## 2019-01-11 PROCEDURE — 82962 GLUCOSE BLOOD TEST: CPT | Mod: 91

## 2019-01-11 PROCEDURE — 770006 HCHG ROOM/CARE - MED/SURG/GYN SEMI*

## 2019-01-11 RX ORDER — AMLODIPINE BESYLATE 10 MG/1
10 TABLET ORAL
Status: DISCONTINUED | OUTPATIENT
Start: 2019-01-11 | End: 2019-01-12 | Stop reason: HOSPADM

## 2019-01-11 RX ORDER — AMLODIPINE BESYLATE 10 MG/1
10 TABLET ORAL DAILY
Qty: 30 TAB | Refills: 0 | Status: SHIPPED | OUTPATIENT
Start: 2019-01-12

## 2019-01-11 RX ORDER — DOXYCYCLINE 100 MG/1
100 CAPSULE ORAL 2 TIMES DAILY
Qty: 10 CAP | Refills: 0 | Status: SHIPPED | OUTPATIENT
Start: 2019-01-11 | End: 2019-01-12

## 2019-01-11 RX ORDER — HYDROCHLOROTHIAZIDE 25 MG/1
25 TABLET ORAL
Status: DISCONTINUED | OUTPATIENT
Start: 2019-01-11 | End: 2019-01-12 | Stop reason: HOSPADM

## 2019-01-11 RX ADMIN — HYDROCHLOROTHIAZIDE 25 MG: 25 TABLET ORAL at 15:29

## 2019-01-11 RX ADMIN — HEPARIN SODIUM 5000 UNITS: 5000 INJECTION, SOLUTION INTRAVENOUS; SUBCUTANEOUS at 20:28

## 2019-01-11 RX ADMIN — OXYCODONE HYDROCHLORIDE 5 MG: 5 TABLET ORAL at 02:07

## 2019-01-11 RX ADMIN — INSULIN LISPRO 5 UNITS: 100 INJECTION, SOLUTION INTRAVENOUS; SUBCUTANEOUS at 05:08

## 2019-01-11 RX ADMIN — OXYCODONE HYDROCHLORIDE 5 MG: 5 TABLET ORAL at 20:28

## 2019-01-11 RX ADMIN — SODIUM CHLORIDE 3 G: 900 INJECTION INTRAVENOUS at 17:02

## 2019-01-11 RX ADMIN — OXYCODONE HYDROCHLORIDE 5 MG: 5 TABLET ORAL at 12:14

## 2019-01-11 RX ADMIN — ATORVASTATIN CALCIUM 10 MG: 10 TABLET, FILM COATED ORAL at 20:28

## 2019-01-11 RX ADMIN — SODIUM CHLORIDE 3 G: 900 INJECTION INTRAVENOUS at 12:14

## 2019-01-11 RX ADMIN — SODIUM CHLORIDE 3 G: 900 INJECTION INTRAVENOUS at 05:06

## 2019-01-11 RX ADMIN — OXYCODONE HYDROCHLORIDE 5 MG: 5 TABLET ORAL at 23:32

## 2019-01-11 RX ADMIN — DOCUSATE SODIUM 100 MG: 100 CAPSULE, LIQUID FILLED ORAL at 17:01

## 2019-01-11 RX ADMIN — AMLODIPINE BESYLATE 10 MG: 10 TABLET ORAL at 12:14

## 2019-01-11 RX ADMIN — SODIUM CHLORIDE 3 G: 900 INJECTION INTRAVENOUS at 22:46

## 2019-01-11 RX ADMIN — VANCOMYCIN HYDROCHLORIDE 1500 MG: 100 INJECTION, POWDER, LYOPHILIZED, FOR SOLUTION INTRAVENOUS at 06:14

## 2019-01-11 RX ADMIN — LISINOPRIL 10 MG: 10 TABLET ORAL at 05:10

## 2019-01-11 RX ADMIN — INSULIN LISPRO 5 UNITS: 100 INJECTION, SOLUTION INTRAVENOUS; SUBCUTANEOUS at 17:02

## 2019-01-11 RX ADMIN — OXYCODONE HYDROCHLORIDE 5 MG: 5 TABLET ORAL at 17:01

## 2019-01-11 RX ADMIN — HEPARIN SODIUM 5000 UNITS: 5000 INJECTION, SOLUTION INTRAVENOUS; SUBCUTANEOUS at 05:10

## 2019-01-11 RX ADMIN — INSULIN LISPRO 5 UNITS: 100 INJECTION, SOLUTION INTRAVENOUS; SUBCUTANEOUS at 12:19

## 2019-01-11 RX ADMIN — INSULIN GLARGINE 40 UNITS: 100 INJECTION, SOLUTION SUBCUTANEOUS at 22:42

## 2019-01-11 RX ADMIN — POLYETHYLENE GLYCOL 3350 1 PACKET: 17 POWDER, FOR SOLUTION ORAL at 05:10

## 2019-01-11 RX ADMIN — ENALAPRILAT 1.25 MG: 2.5 INJECTION INTRAVENOUS at 05:13

## 2019-01-11 RX ADMIN — SODIUM CHLORIDE 3 G: 900 INJECTION INTRAVENOUS at 00:46

## 2019-01-11 RX ADMIN — MORPHINE SULFATE 2 MG: 4 INJECTION INTRAVENOUS at 00:45

## 2019-01-11 RX ADMIN — DOCUSATE SODIUM 100 MG: 100 CAPSULE, LIQUID FILLED ORAL at 05:10

## 2019-01-11 RX ADMIN — HEPARIN SODIUM 5000 UNITS: 5000 INJECTION, SOLUTION INTRAVENOUS; SUBCUTANEOUS at 13:24

## 2019-01-11 RX ADMIN — ENALAPRILAT 1.25 MG: 2.5 INJECTION INTRAVENOUS at 13:24

## 2019-01-11 RX ADMIN — OXYCODONE HYDROCHLORIDE 5 MG: 5 TABLET ORAL at 05:11

## 2019-01-11 RX ADMIN — ACETAMINOPHEN 650 MG: 325 TABLET, FILM COATED ORAL at 22:46

## 2019-01-11 ASSESSMENT — ENCOUNTER SYMPTOMS
HEADACHES: 0
CHILLS: 0
BLURRED VISION: 0
FOCAL WEAKNESS: 0
SEIZURES: 0
WEAKNESS: 1
BACK PAIN: 0
INSOMNIA: 0
MYALGIAS: 0
ORTHOPNEA: 0
EYE PAIN: 0
DIZZINESS: 0
SPUTUM PRODUCTION: 0
COUGH: 0
HEARTBURN: 0
FEVER: 0
VOMITING: 0
STRIDOR: 0
WEIGHT LOSS: 0
NERVOUS/ANXIOUS: 0
EYE DISCHARGE: 0
NAUSEA: 0
DEPRESSION: 0
SHORTNESS OF BREATH: 0
NECK PAIN: 0

## 2019-01-11 ASSESSMENT — PAIN SCALES - GENERAL
PAINLEVEL_OUTOF10: 3
PAINLEVEL_OUTOF10: 2
PAINLEVEL_OUTOF10: 6
PAINLEVEL_OUTOF10: 3
PAINLEVEL_OUTOF10: 4
PAINLEVEL_OUTOF10: 4
PAINLEVEL_OUTOF10: 9
PAINLEVEL_OUTOF10: 6
PAINLEVEL_OUTOF10: 6
PAINLEVEL_OUTOF10: 3

## 2019-01-11 ASSESSMENT — COGNITIVE AND FUNCTIONAL STATUS - GENERAL
CLIMB 3 TO 5 STEPS WITH RAILING: A LITTLE
SUGGESTED CMS G CODE MODIFIER MOBILITY: CI
MOBILITY SCORE: 23

## 2019-01-11 ASSESSMENT — GAIT ASSESSMENTS
ASSISTIVE DEVICE: FRONT WHEEL WALKER;CRUTCHES
DEVIATION: ANTALGIC;STEP TO;DECREASED HEEL STRIKE;DECREASED TOE OFF;OTHER (COMMENT)
GAIT LEVEL OF ASSIST: SUPERVISED

## 2019-01-11 NOTE — DISCHARGE SUMMARY
Discharge Summary    CHIEF COMPLAINT ON ADMISSION  Chief Complaint   Patient presents with   • Knee Pain     redness/swelling       Reason for Admission  Right knee pain     Admission Date  1/8/2019    CODE STATUS  Full Code    HPI & HOSPITAL COURSE  This is a 53 y.o. male here with right knee pain. Please see H&P for details.   He had PMH of DMII, HTN, DLD. He was found to have right lower leg cellulitis. Duplex study was done and negative for DVT  Surgery was consulted and did arthrocentesis and culture grew no bacteria. He was started on IV abx and his symptoms continue to improve.  His BP has been high and added amlodipine 10 mg po qday and hydrochlorthiazide 25 mg qday. Educated to check BP 3 times a day with record and follow up with PCP in 1 week.  He was instructed to come back to ER if symptoms get worse. Dc with PO abx for 5 more days.      Therefore, he is discharged in guarded and stable condition to home with close outpatient follow-up.    The patient met 2-midnight criteria for an inpatient stay at the time of discharge.    Discharge Date  1/12/19    FOLLOW UP ITEMS POST DISCHARGE      DISCHARGE DIAGNOSES  Active Problems:    Cellulitis POA: Yes    Diabetes mellitus type II, uncontrolled (HCC) POA: Yes    Essential hypertension POA: Yes    Other hyperlipidemia POA: Yes    Leucocytosis POA: Yes  Resolved Problems:    * No resolved hospital problems. *      FOLLOW UP  No future appointments.  Mich Malhotra M.D.  [address] [facility]  [place] [address]  [phone]    In 1 week        MEDICATIONS ON DISCHARGE     Medication List      START taking these medications      Instructions   amLODIPine 10 MG Tabs  Commonly known as:  NORVASC   Take 1 Tab by mouth every day.  Dose:  10 mg     amoxicillin-clavulanate 875-125 MG Tabs  Commonly known as:  AUGMENTIN   Take 1 Tab by mouth 2 times a day for 5 days.  Dose:  1 Tab     hydroCHLOROthiazide 25 MG Tabs  Start taking on:  1/13/2019  Commonly known as:   HYDRODIURIL   Take 1 Tab by mouth every day.  Dose:  25 mg        CONTINUE taking these medications      Instructions   atorvastatin 10 MG Tabs  Commonly known as:  LIPITOR   Take 10 mg by mouth every evening.  Dose:  10 mg     insulin aspart 100 UNIT/ML Soln  Commonly known as:  NOVOLOG   Inject 16 Units as instructed 3 times a day before meals.  Dose:  16 Units     insulin glargine 100 UNIT/ML Soln  Commonly known as:  LANTUS   Inject 55 Units as instructed every evening.  Dose:  55 Units     lisinopril 10 MG Tabs  Commonly known as:  PRINIVIL   Take 10 mg by mouth every day.  Dose:  10 mg     metformin 1000 MG tablet  Commonly known as:  GLUCOPHAGE   Take 1,000 mg by mouth every day with lunch.  Dose:  1000 mg     raNITidine 150 MG Tabs  Commonly known as:  ZANTAC   Take 150 mg by mouth every day.  Dose:  150 mg            Allergies  No Known Allergies    DIET  Orders Placed This Encounter   Procedures   • Diet Order Diabetic     Standing Status:   Standing     Number of Occurrences:   1     Order Specific Question:   Diet:     Answer:   Diabetic [3]       ACTIVITY  As tolerated.  Weight bearing as tolerated    CONSULTATIONS  surgery    PROCEDURES      LABORATORY  Lab Results   Component Value Date    SODIUM 139 01/08/2019    POTASSIUM 3.8 01/08/2019    CHLORIDE 103 01/08/2019    CO2 24 01/08/2019    GLUCOSE 203 (H) 01/08/2019    BUN 19 01/08/2019    CREATININE 1.07 01/08/2019        Lab Results   Component Value Date    WBC 13.1 (H) 01/08/2019    HEMOGLOBIN 15.2 01/08/2019    HEMATOCRIT 42.6 01/08/2019    PLATELETCT 246 01/08/2019        Total time of the discharge process exceeds 36 minutes.

## 2019-01-11 NOTE — PROGRESS NOTES
Spoke to MD about pt high BP despite PRN IV BP med. Order received for new oral BP med daily, to include now.

## 2019-01-11 NOTE — PROGRESS NOTES
Shift report received from night RN, assumed care at 0715. Patient up in bed, routinely medicated prn per MAR. AOx4, up x1, calls appropriately, bed alarm not in use. PIV assessed and running ATB. Dressing intact, O2 0.5L nasal cannula, SPO2 93%. VTE heparin. Plan is ATB until POC further planned for dc. Needs met at this time.    Discussed POC for multimodal pain management, monitoring VS/labs/I&O, mobility, day time routine, comfort, and safety. Patient has call light and personal belongings within reach. Safety and fall precautions in place. Reviewed current labs, notes, medications, and orders. Hourly rounding in place with RN and CNA.

## 2019-01-11 NOTE — CARE PLAN
Problem: Safety  Goal: Will remain free from injury  Outcome: PROGRESSING AS EXPECTED  Safety precautions in place. Call light within reach. Bed is low and in locked position. Hourly rounding in place.     Problem: Pain Management  Goal: Pain level will decrease to patient's comfort goal  Outcome: PROGRESSING AS EXPECTED   Follow pain managment plan developed in collaboration with patient and Interdisciplinary Team  Pain well managed with current regimen per MAR

## 2019-01-11 NOTE — PROGRESS NOTES
[facility] Medicine Daily Progress Note    Date of Service  1/11/2019    Chief Complaint  53 y.o. male admitted 1/8/2019 with right leg cellulitis    Hospital Course    52 y/o M with PMH of DMII, HTN, DLD admitted for above.      Interval Problem Update  Pain, swelling and redness over right leg is improving. encouarged him to ambulate.    Consultants/Specialty  ortho    Code Status  full    Disposition  Remain on the floor    Review of Systems  Review of Systems   Constitutional: Positive for malaise/fatigue. Negative for chills, fever and weight loss.   HENT: Negative for congestion and nosebleeds.    Eyes: Negative for blurred vision, pain and discharge.   Respiratory: Negative for cough, sputum production, shortness of breath and stridor.    Cardiovascular: Negative for chest pain and orthopnea.   Gastrointestinal: Negative for heartburn, nausea and vomiting.   Genitourinary: Negative for dysuria and frequency.   Musculoskeletal: Positive for joint pain. Negative for back pain, myalgias and neck pain.        Right leg pain   Skin: Negative for itching and rash.   Neurological: Positive for weakness. Negative for dizziness, focal weakness, seizures and headaches.   Psychiatric/Behavioral: Negative for depression. The patient is not nervous/anxious and does not have insomnia.         Physical Exam  Temp:  [37.1 °C (98.7 °F)-37.7 °C (99.8 °F)] 37.1 °C (98.7 °F)  Pulse:  [68-84] 74  Resp:  [16-20] 16  BP: (130-196)/() 162/88    Physical Exam   Constitutional: He is oriented to person, place, and time. No distress.   HENT:   Head: Normocephalic and atraumatic.   Eyes: Pupils are equal, round, and reactive to light. EOM are normal.   Neck: Normal range of motion. No thyromegaly present.   Cardiovascular: Normal rate and regular rhythm.    No murmur heard.  Pulmonary/Chest: Effort normal. No respiratory distress.   Abdominal: Soft. He exhibits no distension.   Musculoskeletal: He exhibits no edema.   Right leg  swelling, redness around knee joint area.   Neurological: He is alert and oriented to person, place, and time. No cranial nerve deficit.   Skin: Skin is warm and dry. He is not diaphoretic. No erythema.   Psychiatric: He has a normal mood and affect. His behavior is normal. Thought content normal.       Fluids    Intake/Output Summary (Last 24 hours) at 01/11/19 1007  Last data filed at 01/11/19 0830   Gross per 24 hour   Intake              480 ml   Output             1075 ml   Net             -595 ml       Laboratory  Recent Labs      01/08/19   1415   WBC  13.1*   RBC  4.75   HEMOGLOBIN  15.2   HEMATOCRIT  42.6   MCV  89.7   MCH  32.0   MCHC  35.7*   RDW  40.3   PLATELETCT  246   MPV  11.2     Recent Labs      01/08/19   1415   SODIUM  139   POTASSIUM  3.8   CHLORIDE  103   CO2  24   GLUCOSE  203*   BUN  19   CREATININE  1.07   CALCIUM  9.5                   Imaging  US-EXTREMITY VENOUS LOWER UNILAT RIGHT   Final Result      DX-KNEE COMPLETE 4+ RIGHT   Final Result      1.  Mild degenerative change of RIGHT knee.   2.  Mild anterior soft tissue swelling.   3.  Probable small joint effusion.           Assessment/Plan  Cellulitis- (present on admission)   Assessment & Plan    RLE  No DVT on ultrasound  Culture: NGTD on knee aspirate  On IV abx with unasyn  Surgery on  improving         Leucocytosis- (present on admission)   Assessment & Plan    Related to above     Other hyperlipidemia- (present on admission)   Assessment & Plan    Continue atorvastatin     Essential hypertension- (present on admission)   Assessment & Plan    Uncontrolled   Continue lisinopril, IV as needed enalapril  Added amlodipine  Adjust as needed     Diabetes mellitus type II, uncontrolled (HCC)- (present on admission)   Assessment & Plan    Will decrease Lantus to 40 units  Mealtime insulin 5 units  Sliding scale insulin 3  Holding oral hypoglycemic agents  Titrate to achieve normal glycemic control          VTE prophylaxis: heparin

## 2019-01-11 NOTE — CARE PLAN
Problem: Safety  Goal: Will remain free from falls    Intervention: Implement fall precautions  Calls appropriately. Call light and personal belongings within easy reach. Educated on level of risk. Fall precautions in place. Instructed to call for assistance whenever needed.      Problem: Pain Management  Goal: Pain level will decrease to patient's comfort goal    Intervention: Follow pain managment plan developed in collaboration with patient and Interdisciplinary Team  With complaints of pain with movement and medicated per MAR. Clustered nursing interventions to promote rest. Hourly rounding in place. Instructed to report if pain worsens or is unrelieved by pain medications.

## 2019-01-11 NOTE — PROGRESS NOTES
"Pharmacy Kinetics 53 y.o. male on vancomycin day # 4 2019    Currently on Vancomycin 1500 mg IV q8hr (1500, 2300, 0700)     Indication for Treatment: Empiric RLE cellulitis and possible septic arthritis     Pertinent history per medical record: Admitted on 2019 for R knee pain associated with swelling and erythema. Symptoms started on . PMH significant for DM. Ortho has been consulted and performed a joint aspiration on .     Other antibiotics: Unasyn     Allergies: Patient has no known allergies.      Concerns for renal function: BMI ~32, DM     Pertinent cultures to date:    Blood x2 (peripheral): NGTD   R knee aspirate: NGTD x 48hrs    Recent Labs      19   1415   WBC  13.1*   NEUTSPOLYS  81.00*     Recent Labs      19   1415   BUN  19   CREATININE  1.07   ALBUMIN  3.9     Recent Labs      01/10/19   0230   VANCOTROUGH  8.2*     Intake/Output Summary (Last 24 hours) at 19 0956  Last data filed at 19 0830   Gross per 24 hour   Intake              480 ml   Output             1075 ml   Net             -595 ml      Blood pressure (!) 162/88, pulse 74, temperature 37.1 °C (98.7 °F), temperature source Temporal, resp. rate 16, height 1.651 m (5' 5\"), weight 88 kg (194 lb 0.1 oz), SpO2 93 %. Temp (24hrs), Av.3 °C (99.2 °F), Min:37.1 °C (98.7 °F), Max:37.7 °C (99.8 °F)      A/P   1. Vancomycin dose change: none  2. Next vancomycin level: ( at 1400; not ordered)  3. Goal trough: 12-16 mcg/mL  4. Comments: NGTD on knee aspirate cultures x 48hrs.  Ortho surgery consulting and wishing to follow cultures x 72hrs.  UOP appears adequate and that patient is tolerating current vancomycin level.  Due to continued clinical improvement and negative cultures x 48hr, will plan to monitor vancomycin through tomorrow AM, only ordering a trough if continued vancomycin therapy is indicated.    Franko Dunn, PharmD, BCPS        "

## 2019-01-12 VITALS
DIASTOLIC BLOOD PRESSURE: 73 MMHG | BODY MASS INDEX: 32.32 KG/M2 | WEIGHT: 194 LBS | HEART RATE: 70 BPM | RESPIRATION RATE: 20 BRPM | SYSTOLIC BLOOD PRESSURE: 136 MMHG | OXYGEN SATURATION: 93 % | HEIGHT: 65 IN | TEMPERATURE: 97.9 F

## 2019-01-12 LAB
BACTERIA FLD AEROBE CULT: NORMAL
GLUCOSE BLD-MCNC: 219 MG/DL (ref 65–99)
GRAM STN SPEC: NORMAL
SIGNIFICANT IND 70042: NORMAL
SITE SITE: NORMAL
SOURCE SOURCE: NORMAL

## 2019-01-12 PROCEDURE — 90686 IIV4 VACC NO PRSV 0.5 ML IM: CPT | Performed by: INTERNAL MEDICINE

## 2019-01-12 PROCEDURE — 99239 HOSP IP/OBS DSCHRG MGMT >30: CPT | Performed by: INTERNAL MEDICINE

## 2019-01-12 PROCEDURE — 90732 PPSV23 VACC 2 YRS+ SUBQ/IM: CPT | Performed by: INTERNAL MEDICINE

## 2019-01-12 PROCEDURE — A9270 NON-COVERED ITEM OR SERVICE: HCPCS | Performed by: INTERNAL MEDICINE

## 2019-01-12 PROCEDURE — 82962 GLUCOSE BLOOD TEST: CPT

## 2019-01-12 PROCEDURE — 3E02340 INTRODUCTION OF INFLUENZA VACCINE INTO MUSCLE, PERCUTANEOUS APPROACH: ICD-10-PCS | Performed by: INTERNAL MEDICINE

## 2019-01-12 PROCEDURE — 700111 HCHG RX REV CODE 636 W/ 250 OVERRIDE (IP): Performed by: INTERNAL MEDICINE

## 2019-01-12 PROCEDURE — 700102 HCHG RX REV CODE 250 W/ 637 OVERRIDE(OP): Performed by: INTERNAL MEDICINE

## 2019-01-12 PROCEDURE — 90471 IMMUNIZATION ADMIN: CPT

## 2019-01-12 PROCEDURE — 700112 HCHG RX REV CODE 229: Performed by: INTERNAL MEDICINE

## 2019-01-12 PROCEDURE — 700105 HCHG RX REV CODE 258: Performed by: INTERNAL MEDICINE

## 2019-01-12 PROCEDURE — 3E0234Z INTRODUCTION OF SERUM, TOXOID AND VACCINE INTO MUSCLE, PERCUTANEOUS APPROACH: ICD-10-PCS | Performed by: INTERNAL MEDICINE

## 2019-01-12 RX ORDER — AMOXICILLIN AND CLAVULANATE POTASSIUM 875; 125 MG/1; MG/1
1 TABLET, FILM COATED ORAL 2 TIMES DAILY
Qty: 10 TAB | Refills: 0 | Status: SHIPPED | OUTPATIENT
Start: 2019-01-12 | End: 2019-01-17

## 2019-01-12 RX ORDER — HYDROCHLOROTHIAZIDE 25 MG/1
25 TABLET ORAL DAILY
Qty: 30 TAB | Refills: 0 | Status: SHIPPED | OUTPATIENT
Start: 2019-01-13

## 2019-01-12 RX ADMIN — HEPARIN SODIUM 5000 UNITS: 5000 INJECTION, SOLUTION INTRAVENOUS; SUBCUTANEOUS at 04:47

## 2019-01-12 RX ADMIN — LISINOPRIL 10 MG: 10 TABLET ORAL at 04:46

## 2019-01-12 RX ADMIN — HYDROCHLOROTHIAZIDE 25 MG: 25 TABLET ORAL at 04:46

## 2019-01-12 RX ADMIN — OXYCODONE HYDROCHLORIDE 5 MG: 5 TABLET ORAL at 04:46

## 2019-01-12 RX ADMIN — INSULIN LISPRO 5 UNITS: 100 INJECTION, SOLUTION INTRAVENOUS; SUBCUTANEOUS at 05:01

## 2019-01-12 RX ADMIN — ACETAMINOPHEN 650 MG: 325 TABLET, FILM COATED ORAL at 06:32

## 2019-01-12 RX ADMIN — PNEUMOCOCCAL VACCINE POLYVALENT 25 MCG
25; 25; 25; 25; 25; 25; 25; 25; 25; 25; 25; 25; 25; 25; 25; 25; 25; 25; 25; 25; 25; 25; 25 INJECTION, SOLUTION INTRAMUSCULAR; SUBCUTANEOUS at 04:47

## 2019-01-12 RX ADMIN — OXYCODONE HYDROCHLORIDE 5 MG: 5 TABLET ORAL at 09:14

## 2019-01-12 RX ADMIN — INFLUENZA A VIRUS A/MICHIGAN/45/2015 X-275 (H1N1) ANTIGEN (FORMALDEHYDE INACTIVATED), INFLUENZA A VIRUS A/SINGAPORE/INFIMH-16-0019/2016 IVR-186 (H3N2) ANTIGEN (FORMALDEHYDE INACTIVATED), INFLUENZA B VIRUS B/PHUKET/3073/2013 ANTIGEN (FORMALDEHYDE INACTIVATED), AND INFLUENZA B VIRUS B/MARYLAND/15/2016 BX-69A ANTIGEN (FORMALDEHYDE INACTIVATED) 0.5 ML: 15; 15; 15; 15 INJECTION, SUSPENSION INTRAMUSCULAR at 10:47

## 2019-01-12 RX ADMIN — POLYETHYLENE GLYCOL 3350 1 PACKET: 17 POWDER, FOR SOLUTION ORAL at 04:57

## 2019-01-12 RX ADMIN — DOCUSATE SODIUM 100 MG: 100 CAPSULE, LIQUID FILLED ORAL at 04:46

## 2019-01-12 RX ADMIN — AMLODIPINE BESYLATE 10 MG: 10 TABLET ORAL at 04:46

## 2019-01-12 RX ADMIN — SODIUM CHLORIDE 3 G: 900 INJECTION INTRAVENOUS at 04:46

## 2019-01-12 ASSESSMENT — PAIN SCALES - GENERAL
PAINLEVEL_OUTOF10: 7
PAINLEVEL_OUTOF10: 4
PAINLEVEL_OUTOF10: 7
PAINLEVEL_OUTOF10: ASSUMED PAIN PRESENT
PAINLEVEL_OUTOF10: ASSUMED PAIN PRESENT

## 2019-01-12 NOTE — DISCHARGE INSTRUCTIONS
Discharge Instructions    Discharged to home by car with relative. Discharged via wheelchair, hospital escort: Yes.  Special equipment needed: Crutches    Be sure to schedule a follow-up appointment with your primary care doctor or any specialists as instructed.     Discharge Plan:   Diet Plan: Discussed  Activity Level: Discussed  Confirmed Follow up Appointment: Patient to Call and Schedule Appointment  Confirmed Symptoms Management: Discussed  Medication Reconciliation Updated: Yes  Pneumococcal Vaccine Administered/Refused: Given (See MAR)  Influenza Vaccine Indication: Indicated: 9 to 64 years of age    I understand that a diet low in cholesterol, fat, and sodium is recommended for good health. Unless I have been given specific instructions below for another diet, I accept this instruction as my diet prescription.   Other diet: diabetic    Special Instructions: None    · Is patient discharged on Warfarin / Coumadin?   No       Celulitis en los adultos  (Cellulitis, Adult)  La celulitis es branden infección de la piel. La nory infectada generalmente está de color berry y causa dolor. Esta afección ocurre con más frecuencia en los brazos y en las piernas. Es importante realizar un tratamiento para esta afección.  CUIDADOS EN EL HOGAR  · [place] los medicamentos de venta trixie y los recetados solamente rufino se lo haya indicado el médico.  · Si le recetaron un antibiótico, tómelo rufino se lo haya indicado el médico. No deje de diamante los antibióticos aunque comience a sentirse mejor.  · [place] suficiente líquido para mantener el pis (orina) brice o de color amarillo pálido.  · No toque ni frote la nory infectada.  · Cuando esté sentado o acostado, levante (eleve) la nory infectada por encima del nivel del corazón.  · [place] paños húmedos tibios o fríos (compresas tibias o frías) sobre la nory infectada. Hágalo rufino se lo haya indicado el médico.  · Concurra a todas las visitas de control rufino se lo haya indicado el médico.  [place] es importante. Estas visitas le permiten al médico asegurarse de que la infección no está empeorando.  SOLICITE AYUDA SI:  · Tiene fiebre.  · Los síntomas no mejoran después de 1 o 2 días de tratamiento.  · El hueso o la articulación que se encuentran debajo de la nory infectada empiezan a dolerle después de que la piel se juliann.  · La infección regresa. [place] puede ocurrir en la misma nory o en otra.  · Tiene un bulto hinchado en la nory infectada.  · Aparecen nuevos síntomas.  · Se siente enfermo y también tiene sultana musculares.  SOLICITE AYUDA DE INMEDIATO SI:  · Los síntomas empeoran.  · Se siente muy somnoliento.  · Devuelve vomita o tiene deposiciones acuosas (diarrea).  · Tiene líneas tian que salen desde la nory infectada.  · La nory de color berry se agranda.  · La nory de color berry se oscurece.  Esta información no tiene rufino fin reemplazar el consejo del médico. Asegúrese de hacerle al médico cualquier pregunta que tenga.  Document Released: 06/07/2011 Document Revised: 04/10/2017 Document Reviewed: 10/26/2016  [employer] Interactive Patient Education © 2017 [employer]      Amlodipine tablets  ¿Qué es araceli medicamento?  La AMLODIPINA es un bloqueador de los ramirez de calcio. Afecta la cantidad de calcio en las células del corazón y de los músculos. [place] produce branden relajación de los vasos sanguíneos, lo que puede reducir la carga de trabajo del corazón. Araceli medicamento reduce la ariadna presión sanguínea. Además se utiliza para prevenir el dolor en el pecho.  Araceli medicamento puede ser utilizado para otros usos; si tiene alguna pregunta consulte con monzon proveedor de atención médica o con monzon farmacéutico.  MARCAS COMUNES: Norvasc  ¿Qué le darinel informar a mi profesional de la adeel antes de diamante araceli medicamento?  Necesita saber si usted presenta alguno de los siguientes problemas o situaciones:  -problemas cardiacos, tales rufino insuficiencia cardiaca o estenosis aórtica  -enfermedad hepática  -branden  reacción alérgica o inusual a la amlodipina, a otros medicamentos, alimentos, colorantes o conservantes  -si está embarazada o buscando quedar embarazada  -si está amamantando a un bebé  ¿Cómo darinel utilizar araceli medicamento?  [place] araceli medicamento por vía oral con un vaso de agua. Siga las instrucciones de la etiqueta del medicamento. [place] macarena dosis a intervalos regulares. No tome monzon medicamento con branden frecuencia mayor a la indicada.  Hable con monzon pediatra para informarse acerca del uso de araceli medicamento en niños. Puede requerir atención especial. Araceli medicamento ha sido recetado a niños tan menores rufino de 6 años de edad.  Los pacientes de más de 65 años de edad pueden presentar reacciones más joyce a araceli medicamento y necesitar dosis menores.  Sobredosis: Póngase en contacto inmediatamente con un centro toxicológico o branden durga de urgencia si usted chey que haya tomado demasiado medicamento.  ATENCIÓN: Araceli medicamento es solo para usted. No comparta araceli medicamento con nadie.  ¿Qué sucede si me olvido de branden dosis?  Si olvida branden dosis, tómela lo antes posible. Si es kulwinder la hora de la próxima dosis, tome sólo crow dosis. No tome dosis adicionales o dobles.  ¿Qué puede interactuar con araceli medicamento?  -suplementos dietéticos o a base de hierbas  -anestésicos locales o generales  -medicamentos para la ariadna presión sanguínea  -medicamentos para problemas de próstata  -rifampicina  Puede ser que esta lista no menciona todas las posibles interacciones. Informe a monzon profesional de la adeel de todos los productos a base de hierbas, medicamentos de venta trixie o suplementos nutritivos que esté tomando. Si usted fuma, consume bebidas alcohólicas o si utiliza drogas ilegales, indíqueselo también a moznon profesional de la adeel. Algunas sustancias pueden interactuar con monzon medicamento.  ¿A qué darinel estar atento al usar araceli medicamento?  Visite a monzon médico o a monzon profesional de la adeel para chequear monzon evolución  periódicamente. Es importante que controle monzon presión sanguínea y frecuencia cardiaca regularmente. Pregunte a monzon médico o a monzon profesional de la adeel cuál debe ser monzon presión sanguínea y frecuencia cardiaca y cuándo deberá comunicarse con él o acacia.  Elsa medicamento puede hacerle sentirse confundido, mareado o aturdido. No conduzca ni utilice maquinaria, ni geovanna nada que le exija permanecer en estado de alerta hasta que sepa cómo le afecta elsa medicamento. Para reducir el riesgo de mareos o desmayos, no se siente ni se ponga de pie con rapidez, especialmente si es un paciente de edad avanzada. Evite las bebidas alcohólicas ya que pueden provocarle mareos.  No suspenda la amlodipina de manera abrupta. Consulte a monzon médico o a monzon profesional de la adeel sobre cómo reducir monzon dosis de manera gradual.  ¿Qué efectos secundarios puedo tener al utilizar elsa medicamento?  Efectos secundarios que debe informar a monzon médico o a monzon profesional de la adeel tan pronto rufino sea posible:  -reacciones alérgicas rufino erupción cutánea, picazón o urticarias, hinchazón de la noble, labios o lengua  -problemas respiratorios  -cambios en la visión o audición  -dolor en el pecho  -pulso cardiaco rápido, irregular  -hinchazón de piernas o tobillos  Efectos secundarios que, por lo general, no requieren atención médica (debe informarlos a monzon médico o a monzon profesional de la adeel si persisten o si son molestos):  -boca seca  -enrojecimiento de la noble  -náuseas, vómito  -gases o dolor de estómago  -debilidad o fatiga  -dificultad para dormir  Puede ser que esta lista no menciona todos los posibles efectos secundarios. Comuníquese a monzon médico por asesoramiento médico sobre los efectos secundarios. Usted puede informar los efectos secundarios a la FDA por teléfono al 1-800-FDA-1088.  ¿Dónde darinel guardar mi medicina?  Manténgala fuera del alcance de los niños.  [place] a temperatura ambiente, entre 15 y 30 grados C (59 y 86 grados F).  Protéjala brigido. Mantenga el envase abiel cerrado. Deseche todo el medicamento que no haya utilizado, después de la fecha de vencimiento.  ATENCIÓN: Elsa folleto es un resumen. Puede ser que no cubra toda la posible información. Si usted tiene preguntas acerca de esta medicina, consulte con monzon médico, monzon farmacéutico o monzon profesional de la adeel.  © 2018 Elsevier/Gold Standard (2016-02-09 00:00:00)    Amoxicillin capsules or tablets  ¿Qué es elsa medicamento?  La AMOXICILINA es un antibiótico penicilínico. Se utiliza en el tratamiento de ciertos tipos de infecciones bacterianas. No es efectivo para resfríos, gripe u otras infecciones de origen viral.  Elsa medicamento puede ser utilizado para otros usos; si tiene alguna pregunta consulte con monzon proveedor de atención médica o con monzon farmacéutico.  MARCAS COMUNES: Amoxil, Moxilin, Sumox, Trimox  ¿Qué le darinel informar a mi profesional de la adeel antes de diamante elsa medicamento?  Necesita saber si usted presenta alguno de los siguientes problemas o situaciones:  -asma  -enfermedad renal  -branden reacción alérgica o inusual a la amoxicilina, otras penicilinas, antibióticos cefalosporínicos, otros medicamentos, alimentos, colorantes o conservadores  -si está embarazada o buscando quedar embarazada  -si está amamantando a un bebé  ¿Cómo darinel utilizar elsa medicamento?  [place] elsa medicamento por vía oral con un vaso de agua. Siga las instrucciones de la etiqueta del medicamento. Elsa medicamento se puede diamante con alimentos o con el estómago vacío. [place] macarena dosis a intervalos regulares. No tome monzon medicamento con branden frecuencia mayor a la indicada. Complete todo el tratamiento con el medicamento según lo haya recetado monzon médico aun si considera que monzon problema ha irma. No omita ninguna dosis o suspenda el uso de monzon medicamento antes de lo indicado.  Hable con monzon pediatra para informarse acerca del uso de elsa medicamento en niños. Aunque elsa medicamento se puede  recetar para condiciones selectivas, las precauciones se aplican.  Sobredosis: Póngase en contacto inmediatamente con un centro toxicológico o branden durga de urgencia si usted chey que haya tomado demasiado medicamento.  ATENCIÓN: Araceli medicamento es solo para usted. No comparta araceli medicamento con nadie.  ¿Qué sucede si me olvido de branden dosis?  Si olvida branden dosis, tómela lo antes posible. Si es kulwinder la hora de la próxima dosis, tome sólo crow dosis. No tome dosis adicionales o dobles.  ¿Qué puede interactuar con araceli medicamento?  -amilorida  -píldoras anticonceptivas  -cloranfenicol  -macrólidos  -probenecid  -sulfonamidas  -tetraciclina  Puede ser que esta lista no menciona todas las posibles interacciones. Informe a monzon profesional de la adeel de todos los productos a base de hierbas, medicamentos de venta trixie o suplementos nutritivos que esté tomando. Si usted fuma, consume bebidas alcohólicas o si utiliza drogas ilegales, indíqueselo también a monzon profesional de la adeel. Algunas sustancias pueden interactuar con monzon medicamento.  ¿A qué darinel estar atento al usar araceli medicamento?  Si los síntomas no mejoran a los 2 ó 3 días, consulte con monzon médico o con monzon profesional de la adeel. Lemitar todas las dosis de monzon medicamento rufino se le haya indicado. No omita ninguna dosis o suspenda el uso de monzon medicamento antes de lo indicado.  Si es diabético podrá obtener un resultado positivo falso en los análisis de determinación del nivel de azúcar en la orina con algunas marcas de pruebas de orina. Consulte con monzon médico.  No trate la diarrea con productos de venta trixie. Comuníquese con monzon médico si tiene diarrea que dura más de 2 días o si es severa y acuosa.  ¿Qué efectos secundarios puedo tener al utilizar araceli medicamento?  Efectos secundarios que debe informar a monzon médico o a monzon profesional de la adeel tan pronto rufino sea posible:  -reacciones alérgicas rufino erupción cutánea, picazón o urticarias, hinchazón de la noble,  labios o lengua  -problemas respiratorios  -orina de color amarillo oscuro  -enrojecimiento, formación de ampollas, descamación o aflojamiento de la piel, inclusive dentro de la boca  -convulsiones  -diarrea severa o acuosa  -dificultad para orinar o cambios en el volumen de orina  -sangrado o magulladuras inusuales  -cansancio o debilidad inusual  -color amarillento de ojos o piel  Efectos secundarios que, por lo general, no requieren atención médica (debe informarlos a monzon médico o a monzon profesional de la adeel si persisten o si son molestos):  -mareos  -dolor de ambrose  -malestar estomacal  -dificultad para dormir  Puede ser que esta lista no menciona todos los posibles efectos secundarios. Comuníquese a monzon médico por asesoramiento médico sobre los efectos secundarios. Usted puede informar los efectos secundarios a la FDA por teléfono al [phone]-FDA-[phone].  ¿Dónde darinel guardar mi medicina?  Manténgala fuera del alcance de los niños.  [place] a temperatura, entre 20 y 25 grados C (68 y 77 grados F). Mantenga el envase abiel cerrado. Deseche todo el medicamento que no haya utilizado, después de la fecha de vencimiento.  ATENCIÓN: Araceli folleto es un resumen. Puede ser que no cubra toda la posible información. Si usted tiene preguntas acerca de esta medicina, consulte con monzon médico, monzon farmacéutico o monzon profesional de la adeel.  © 2018 Elsevier/Gold Standard (2016-02-09 00:00:00)      Hydrochlorothiazide, HCTZ capsules or tablets  ¿Qué es araceli medicamento?  La HIDROCLOROTIAZIDA es un diurético. Ayuda a incrementar el volumen de orina, lo que provoca que el cuerpo pierda sal y agua. Araceli medicamento se utiliza para tratar la ariadna presión sanguínea. También reduce la hinchazón y la retención de agua causadas por diversas enfermedades, tales rufino las cardiacas, hepáticas o renales.  Araceli medicamento puede ser utilizado para otros usos; si tiene alguna pregunta consulte con monzon proveedor de atención médica o con monzon  farmacéutico.  MARCAS COMUNES: Esidrix, Ezide, HydroDIURIL, Microzide, Oretic, Zide  ¿Qué le darinel informar a mi profesional de la adeel antes de diamante araceli medicamento?  Necesita saber si usted presenta alguno de los siguientes problemas o situaciones:  -diabetes  -gota  -problemas del sistema inmunológico, rufino lupus  -enfermedad renal o cálculos renales  -enfermedad hepática  -pancreatitis  -bajo volumen de orina o dificultad para orinar  -branden reacción alérgica o inusual a la hidroclorotiazida, a los medicamentos del [place] de los sulfonamidas, a otros medicamentos, alimentos, colorantes o conservantes  -si está embarazada o buscando quedar embarazada  -si está amamantando a un bebé  ¿Cómo darinel utilizar araceli medicamento?  [place] araceli medicamento por vía oral con un vaso de agua. Siga las instrucciones de la etiqueta del medicamento. [place] macarena dosis a intervalos regulares. Recuerde que necesitará orinar con frecuencia después de diamante araceli medicamento. No tome macarena dosis en horarios que le causen problemas. No deje de tomarlo excepto si así lo indica monzon médico.  Hable con monzon pediatra para informarse acerca del uso de araceli medicamento en niños. Puede requerir atención especial.  Sobredosis: Póngase en contacto inmediatamente con un centro toxicológico o barnden durga de urgencia si usted chey que haya tomado demasiado medicamento.  ATENCIÓN: Araceli medicamento es solo para usted. No comparta araceli medicamento con nadie.  ¿Qué sucede si me olvido de branden dosis?  Si olvida branden dosis, tómela lo antes posible. Si es kulwinder la hora de la próxima dosis, tome sólo crow dosis. No tome dosis adicionales o dobles.  ¿Qué puede interactuar con araceli medicamento?  -colestiramina  -colestipol  -digoxina  -dofetilida  -litio  -medicamentos para la presión sanguínea  -medicamentos para la diabetes  -medicamentos para relajar las músculos antes de branden cirugía  -otros diuréticos  -esteroides, tales rufino la cortisona o prednisona  Puede ser que esta  lista no menciona todas las posibles interacciones. Informe a monzon profesional de la adeel de todos los productos a base de hierbas, medicamentos de venta trixie o suplementos nutritivos que esté tomando. Si usted fuma, consume bebidas alcohólicas o si utiliza drogas ilegales, indíqueselo también a monzon profesional de la adeel. Algunas sustancias pueden interactuar con monzon medicamento.  ¿A qué darinel estar atento al usar araceli medicamento?  Visite a monzon médico o a monzon profesional de la adeel para chequear monzon evolución periódicamente. Controle monzon presión sanguínea rufino le haya indicado. Pregunte a monzon médico o monzon profesional de la adeel cuál debe ser monzon presión sanguínea y cuándo deberá comunicarse con él/acacia.  Puede necesitar seguir branden dieta especial mientras esté tomando araceli medicamento. Consulte a monzon médico acerca de esto.  Si sufre ataques severos de diarrea, náuseas, vómitos o si suda demasiado, consulte con monzon médico o monzon profesional de la adeel. La pérdida excesiva de líquidos corporales puede hacer que sea peligroso diamante araceli medicamento.  Puede experimentar somnolencia o mareos. No conduzca ni utilice maquinaria ni geovanna nada que le exija permanecer en estado de alerta hasta que sepa cómo le afecta araceli medicamento. No se siente ni se ponga de pie con rapidez, especialmente si es un paciente de edad avanzada. [place] reduce el riesgo de mareos o desmayos. El alcohol puede interferir con el efecto de araceli medicamento. Evite consumir bebidas alcohólicas.  Araceli medicamento puede afectar monzon nivel de azúcar en la [place]. Si tiene diabetes, consulte a monzon médico o a monzon profesional de la adeel antes de cambiar la dosis de monzon medicamento para la diabetes.  Araceli medicamento puede aumentar monzon sensibilidad al sol. Manténgase fuera brigido solar. Si no lo puede evitar, utilice ropa protectora y crema de protección solar. No utilice lámparas jarrod, olivia jarrod ni cabinas jarrod.  ¿Qué efectos secundarios puedo tener al  utilizar araceli medicamento?  Efectos secundarios que debe informar a monzon médico o a monzon profesional de la adeel tan pronto rufino sea posible:  -reacciones alérgicas rufino erupción cutánea o picazón, urticarias, hinchazón de los labios, boca, lengua o garganta  -cambios en la visión  -dolor en el pecho  -dolor en los ojos  -pulso cardiaco rápido o irregular  -sensación de desmayos o mareos, caídas  -ataque de gota  -dolor o calambre muscular  -dolor o dificultad para orinar  -dolor, hormigueo, entumecimiento de abhay o pies  -enrojecimiento, formación de ampollas, descamación o aflojamiento de la piel, inclusive dentro de la boca  -cansancio o debilidad inusual  Efectos secundarios que, por lo general, no requieren atención médica (debe informarlos a monzon médico o a monzon profesional de la adeel si persisten o si son molestos):  -cambios en el deseo sexual o capacidad  -boca seca  -dolor de ambrose  -malestar estomacal  Puede ser que esta lista no menciona todos los posibles efectos secundarios. Comuníquese a monzon médico por asesoramiento médico sobre los efectos secundarios. Usted puede informar los efectos secundarios a la FDA por teléfono al [phone]-[phone].  ¿Dónde darinel guardar mi medicina?  Manténgala fuera del alcance de los niños.  [place] a temperatura ambiente, entre 15 y 30 grados C (59 y 86 grados F). No la congele. Protéjala brigido y la humedad. Mantenga el envase abiel cerrado. Deseche todo el medicamento que no haya utilizado, después de la fecha de vencimiento.  ATENCIÓN: Araceli folleto es un resumen. Puede ser que no cubra toda la posible información. Si usted tiene preguntas acerca de esta medicina, consulte con monzon médico, monzon farmacéutico o monzon profesional de la adeel.  © 2018 Elsevier/Gold Standard (2016-02-09 00:00:00)        Depression / Suicide Risk    As you are discharged from this [facility] facility, it is important to learn how to keep safe from harming yourself.    Recognize the warning  signs:  · Abrupt changes in personality, positive or negative- including increase in energy   · Giving away possessions  · Change in eating patterns- significant weight changes-  positive or negative  · Change in sleeping patterns- unable to sleep or sleeping all the time   · Unwillingness or inability to communicate  · Depression  · Unusual sadness, discouragement and loneliness  · Talk of wanting to die  · Neglect of personal appearance   · Rebelliousness- reckless behavior  · Withdrawal from people/activities they love  · Confusion- inability to concentrate     If you or a loved one observes any of these behaviors or has concerns about self-harm, here's what you can do:  · Talk about it- your feelings and reasons for harming yourself  · Remove any means that you might use to hurt yourself (examples: pills, rope, extension cords, firearm)  · Get professional help from the community (Mental Health, Substance Abuse, psychological counseling)  · Do not be alone:Call your Safe Contact- someone whom you trust who will be there for you.  · Call your local CRISIS HOTLINE 987-8126 or 785-115-2723  · Call your local Children's Mobile Crisis Response Team [place] (356) 076-3269 or www.[employer]  · Call the toll free National Suicide Prevention Hotlines   · National Suicide Prevention Lifeline 185-562-PMMP (4708)  · National Hope Line Network 800-SUICIDE (055-8636)

## 2019-01-12 NOTE — CARE PLAN
Problem: Venous Thromboembolism (VTW)/Deep Vein Thrombosis (DVT) Prevention:  Goal: Patient will participate in Venous Thrombosis (VTE)/Deep Vein Thrombosis (DVT)Prevention Measures   01/11/19 2300   OTHER   Risk Assessment Score 3   VTE RISK High   Pharmacologic Prophylaxis Used Unfractionated Heparin   Encouraged to ambulate.    Problem: Respiratory:  Goal: Respiratory status will improve    Intervention: Educate and encourage incentive spirometry usage  On O2 at 0.5LPM per nasal cannula with O2 sat at 94%. IS of 200). Encouraged use of IS and DBE.

## 2019-01-13 LAB
BACTERIA BLD CULT: NORMAL
BACTERIA BLD CULT: NORMAL
SIGNIFICANT IND 70042: NORMAL
SIGNIFICANT IND 70042: NORMAL
SITE SITE: NORMAL
SITE SITE: NORMAL
SOURCE SOURCE: NORMAL
SOURCE SOURCE: NORMAL

## 2022-07-05 ENCOUNTER — HOSPITAL ENCOUNTER (EMERGENCY)
Facility: MEDICAL CENTER | Age: 57
End: 2022-07-05
Attending: EMERGENCY MEDICINE
Payer: COMMERCIAL

## 2022-07-05 ENCOUNTER — APPOINTMENT (OUTPATIENT)
Dept: RADIOLOGY | Facility: MEDICAL CENTER | Age: 57
End: 2022-07-05
Attending: EMERGENCY MEDICINE
Payer: COMMERCIAL

## 2022-07-05 VITALS
RESPIRATION RATE: 16 BRPM | HEIGHT: 65 IN | OXYGEN SATURATION: 96 % | DIASTOLIC BLOOD PRESSURE: 88 MMHG | TEMPERATURE: 97.7 F | WEIGHT: 207.89 LBS | SYSTOLIC BLOOD PRESSURE: 174 MMHG | HEART RATE: 79 BPM | BODY MASS INDEX: 34.64 KG/M2

## 2022-07-05 DIAGNOSIS — L03.116 CELLULITIS OF LEFT LOWER EXTREMITY: ICD-10-CM

## 2022-07-05 PROCEDURE — 700102 HCHG RX REV CODE 250 W/ 637 OVERRIDE(OP): Performed by: EMERGENCY MEDICINE

## 2022-07-05 PROCEDURE — 93971 EXTREMITY STUDY: CPT | Mod: LT

## 2022-07-05 PROCEDURE — 99283 EMERGENCY DEPT VISIT LOW MDM: CPT

## 2022-07-05 PROCEDURE — A9270 NON-COVERED ITEM OR SERVICE: HCPCS | Performed by: EMERGENCY MEDICINE

## 2022-07-05 RX ORDER — DOXYCYCLINE 100 MG/1
100 TABLET ORAL ONCE
Status: COMPLETED | OUTPATIENT
Start: 2022-07-05 | End: 2022-07-05

## 2022-07-05 RX ORDER — DOXYCYCLINE 100 MG/1
100 CAPSULE ORAL 2 TIMES DAILY
Qty: 14 CAPSULE | Refills: 0 | Status: SHIPPED | OUTPATIENT
Start: 2022-07-05 | End: 2022-07-12

## 2022-07-05 RX ADMIN — DOXYCYCLINE 100 MG: 100 TABLET, FILM COATED ORAL at 19:04

## 2022-07-05 NOTE — ED TRIAGE NOTES
"Chief Complaint   Patient presents with   • Knee Pain     Pt noticed a pain on left knee x1 week. Redness, swelling noted to area. Pt has hx of cellulitis to left knee.  +fevers     Pt ambulatory to triage for above complaint. Pt last took 800mg ibuprofen at 1200 for pain/fevers. Pt educated on triage process and informed to alert staff of any changes or concerns.    BP (!) 176/103 Comment: second BP : 182/80  Pulse 75   Temp 35.9 °C (96.6 °F) (Temporal)   Resp 16   Ht 1.651 m (5' 5\")   Wt 94.3 kg (207 lb 14.3 oz)   SpO2 95%   BMI 34.60 kg/m²     "

## 2022-07-06 NOTE — ED NOTES
Pt ambulated back to the room from the lobby with a slight limp and steady gait. Pt asked to change into a gown.  Agree with triage note. Chart up for ERP.

## 2022-07-06 NOTE — ED PROVIDER NOTES
"ED Provider Note    CHIEF COMPLAINT  Chief Complaint   Patient presents with   • Knee Pain     Pt noticed a pain on left knee x1 week. Redness, swelling noted to area. Pt has hx of cellulitis to left knee.  +fevers       HPI  Epi Cortés is a 56 y.o. male who ambulates to the emergency department through triage for left knee pain, swelling and redness.  Patient describes progressive left medial knee redness and warmth over the last week.  Pain is minimal, 1-2 out of 10.  Not much worse with range of motion.  Works as a  and is still able to mobilize, kneel and climb without difficulty.  Denies trauma or injury.  Some pain radiates into the calf.  No paresthesias.  No fever or chills.  No shortness of breath, palpitations, chest pain or syncope.    History of \"cellulitis\" in other areas previously.  No history for abscess.  Patient is a diabetic but compliant with medications.    REVIEW OF SYSTEMS  See HPI for further details. All other systems are negative.     PAST MEDICAL HISTORY   has a past medical history of Diabetes (HCC) and Hypertension.    SOCIAL HISTORY  Social History     Tobacco Use   • Smoking status: Never Smoker   • Smokeless tobacco: Never Used   Vaping Use   • Vaping Use: Never used   Substance and Sexual Activity   • Alcohol use: Yes     Comment: occ   • Drug use: No   • Sexual activity: Not on file       SURGICAL HISTORY  patient denies any surgical history    CURRENT MEDICATIONS  Home Medications     Reviewed by Lexus Villalpando R.N. (Registered Nurse) on 07/05/22 at 1618  Med List Status: Not Addressed   Medication Last Dose Status   amLODIPine (NORVASC) 10 MG Tab  Active   atorvastatin (LIPITOR) 10 MG Tab  Active   hydroCHLOROthiazide (HYDRODIURIL) 25 MG Tab  Active   insulin aspart (NOVOLOG) 100 UNIT/ML Solution  Active   insulin glargine (LANTUS) 100 UNIT/ML Solution  Active   lisinopril (PRINIVIL) 10 MG Tab  Active   metformin (GLUCOPHAGE) 1000 MG tablet  Active " "  raNITidine (ZANTAC) 150 MG Tab  Active                ALLERGIES  No Known Allergies    PHYSICAL EXAM  VITAL SIGNS: BP (!) 174/88   Pulse 79   Temp 36.5 °C (97.7 °F) (Temporal)   Resp 16   Ht 1.651 m (5' 5\")   Wt 94.3 kg (207 lb 14.3 oz)   SpO2 96%   BMI 34.60 kg/m²   Pulse ox interpretation: I interpret this pulse ox as normal.  Constitutional: Alert in no apparent distress.  HENT: Normocephalic, atraumatic. Bilateral external ears normal, Nose normal. Moist mucous membranes.    Eyes: Pupils are equal and reactive, Conjunctiva normal.   Neck: Normal range of motion, Supple  Lymphatic: No lymphadenopathy noted.   Cardiovascular: Regular rate and rhythm, no murmurs. Distal pulses intact.  No peripheral edema.  Thorax & Lungs: Normal breath sounds.  No wheezing/rales/ronchi. No increased work of breathing, clipped speech or retractions.   Skin: Warm, Dry, No erythema, No rash.   Musculoskeletal: Left knee with mild swelling, no significant effusion.  Warmth, erythema medially.  Some swelling and discomfort with palpation extended to the left calf.  No crepitus, induration or fluctuance.  2+ DP, less than 2-second capillary refill, sensation tact light touch distally.  Full range of motion without pain or resistance at hip, knee.  Neurologic: Alert and orient x4.  Speech clear and cohesive.  Psychiatric: Affect normal, Judgment normal, Mood normal.       DIAGNOSTIC STUDIES / PROCEDURES    RADIOLOGY     FINDINGS:   Left lower extremity.    No deep venous thrombosis.    All veins demonstrate complete color filling and compressibility with    normal venous flow dynamics including spontaneous flow and respiratory    phasicity.    Interstitial fluid consistent with edema is observed in the thigh and below    the knee.    Interstitial fluid noted in the anterior knee (below kneecap).      Flow was evaluated in the contralateral common femoral vein and normal    venous flow dynamics including spontaneous flow and " respiratory phasic    variation were demonstrated.               (Electronically Signed)      COURSE & MEDICAL DECISION MAKING  Nursing notes and vital signs were reviewed. (See chart for details)  The patients records were reviewed, history was obtained from the patient;     ED evaluation left knee pain, redness, swelling most consistent with cellulitis.  No clinical evidence for abscess or necrotizing fasciitis.  CMS intact distally.  No radiographic evidence for DVT.  Patient has history of the same previously but no recent antibiotic use.  Hemodynamically stable without fever or tachycardia.  First dose doxycycline in the emergency department, will continue for 7 days.  Tylenol or ibuprofen for discomfort.    Patient is stable for discharge at this time, anticipatory guidance provided, doxycycline for 7 days, continue other home medications, close follow-up is encouraged with primary care or ED in 48 hours for wound check, and strict ED return instructions have been detailed. Patient is agreeable to the disposition and plan.    FINAL IMPRESSION  (L03.116) Cellulitis of left lower extremity      Electronically signed by: Maureen Reyes D.O., 7/5/2022 6:48 PM      This dictation was created using voice recognition software. The accuracy of the dictation is limited to the abilities of the software. I expect there may be some errors of grammar and possibly content. The nursing notes were reviewed and certain aspects of this information were incorporated into this note.

## 2022-07-06 NOTE — DISCHARGE INSTRUCTIONS
Follow-up with primary care 2 days for reevaluation and wound check.  If follow-up with primary care cannot be arranged, return to the emergency department in 2 days for wound check.    Doxycycline twice daily for 7 days for cellulitis.  Tylenol or ibuprofen, alternating if needed, as needed for discomfort.  Continue other home medications as previously indicated.    Elevate as needed for swelling or discomfort.  Otherwise activity and weightbearing as tolerated.    Return to the emergency department for increased knee/leg pain, swelling, redness, fever or other new concerns.